# Patient Record
Sex: FEMALE | Race: WHITE | NOT HISPANIC OR LATINO | Employment: OTHER | URBAN - METROPOLITAN AREA
[De-identification: names, ages, dates, MRNs, and addresses within clinical notes are randomized per-mention and may not be internally consistent; named-entity substitution may affect disease eponyms.]

---

## 2021-01-22 ENCOUNTER — HISTORICAL (OUTPATIENT)
Dept: ADMINISTRATIVE | Facility: HOSPITAL | Age: 71
End: 2021-01-22

## 2021-01-22 LAB
ABS NEUT (OLG): 5.01 X10(3)/MCL (ref 2.1–9.2)
ALBUMIN SERPL-MCNC: 4 GM/DL (ref 3.4–4.8)
ALBUMIN/GLOB SERPL: 1.1 RATIO (ref 1.1–2)
ALP SERPL-CCNC: 78 UNIT/L (ref 40–150)
ALT SERPL-CCNC: 13 UNIT/L (ref 0–55)
AST SERPL-CCNC: 14 UNIT/L (ref 5–34)
BASOPHILS # BLD AUTO: 0.1 X10(3)/MCL (ref 0–0.2)
BASOPHILS NFR BLD AUTO: 1 %
BILIRUB SERPL-MCNC: 0.4 MG/DL
BILIRUBIN DIRECT+TOT PNL SERPL-MCNC: 0.2 MG/DL (ref 0–0.5)
BILIRUBIN DIRECT+TOT PNL SERPL-MCNC: 0.2 MG/DL (ref 0–0.8)
BUN SERPL-MCNC: 12 MG/DL (ref 9.8–20.1)
CALCIUM SERPL-MCNC: 9.7 MG/DL (ref 8.4–10.2)
CHLORIDE SERPL-SCNC: 101 MMOL/L (ref 98–107)
CHOLEST SERPL-MCNC: 182 MG/DL
CHOLEST/HDLC SERPL: 3 {RATIO} (ref 0–5)
CO2 SERPL-SCNC: 28 MMOL/L (ref 23–31)
CREAT SERPL-MCNC: 0.69 MG/DL (ref 0.55–1.02)
CREAT UR-MCNC: 21.1 MG/DL (ref 45–106)
EOSINOPHIL # BLD AUTO: 0.1 X10(3)/MCL (ref 0–0.9)
EOSINOPHIL NFR BLD AUTO: 1 %
ERYTHROCYTE [DISTWIDTH] IN BLOOD BY AUTOMATED COUNT: 13.2 % (ref 11.5–14.5)
EST. AVERAGE GLUCOSE BLD GHB EST-MCNC: 139.8 MG/DL
GLOBULIN SER-MCNC: 3.5 GM/DL (ref 2.4–3.5)
GLUCOSE SERPL-MCNC: 133 MG/DL (ref 82–115)
HBA1C MFR BLD: 6.5 %
HCT VFR BLD AUTO: 45.7 % (ref 35–46)
HDLC SERPL-MCNC: 69 MG/DL (ref 35–60)
HGB BLD-MCNC: 14.2 GM/DL (ref 12–16)
IMM GRANULOCYTES # BLD AUTO: 0.02 10*3/UL
IMM GRANULOCYTES NFR BLD AUTO: 0 %
LDLC SERPL CALC-MCNC: 100 MG/DL (ref 50–140)
LYMPHOCYTES # BLD AUTO: 2.3 X10(3)/MCL (ref 0.6–4.6)
LYMPHOCYTES NFR BLD AUTO: 28 %
MCH RBC QN AUTO: 26.5 PG (ref 26–34)
MCHC RBC AUTO-ENTMCNC: 31.1 GM/DL (ref 31–37)
MCV RBC AUTO: 85.3 FL (ref 80–100)
MICROALBUMIN UR-MCNC: <5 UG/ML
MICROALBUMIN/CREAT RATIO PNL UR: <23.7 MG/GM CR (ref 0–30)
MONOCYTES # BLD AUTO: 0.6 X10(3)/MCL (ref 0.1–1.3)
MONOCYTES NFR BLD AUTO: 8 %
NEUTROPHILS # BLD AUTO: 5.01 X10(3)/MCL (ref 2.1–9.2)
NEUTROPHILS NFR BLD AUTO: 62 %
PLATELET # BLD AUTO: 314 X10(3)/MCL (ref 130–400)
PMV BLD AUTO: 9.9 FL (ref 7.4–10.4)
POTASSIUM SERPL-SCNC: 4 MMOL/L (ref 3.5–5.1)
PROT SERPL-MCNC: 7.5 GM/DL (ref 5.8–7.6)
RBC # BLD AUTO: 5.36 X10(6)/MCL (ref 4–5.2)
SODIUM SERPL-SCNC: 142 MMOL/L (ref 136–145)
TRIGL SERPL-MCNC: 67 MG/DL (ref 37–140)
TSH SERPL-ACNC: 1.44 UIU/ML (ref 0.35–4.94)
VLDLC SERPL CALC-MCNC: 13 MG/DL
WBC # SPEC AUTO: 8.1 X10(3)/MCL (ref 4.5–11)

## 2021-04-27 ENCOUNTER — HISTORICAL (OUTPATIENT)
Dept: ADMINISTRATIVE | Facility: HOSPITAL | Age: 71
End: 2021-04-27

## 2021-04-27 LAB
ABS NEUT (OLG): 5.72 X10(3)/MCL (ref 2.1–9.2)
ALBUMIN SERPL-MCNC: 4 GM/DL (ref 3.4–4.8)
ALBUMIN/GLOB SERPL: 1.4 RATIO (ref 1.1–2)
ALP SERPL-CCNC: 78 UNIT/L (ref 40–150)
ALT SERPL-CCNC: 11 UNIT/L (ref 0–55)
AST SERPL-CCNC: 12 UNIT/L (ref 5–34)
BASOPHILS # BLD AUTO: 0.1 X10(3)/MCL (ref 0–0.2)
BASOPHILS NFR BLD AUTO: 0.7 %
BILIRUB SERPL-MCNC: 0.3 MG/DL
BILIRUBIN DIRECT+TOT PNL SERPL-MCNC: 0.1 MG/DL (ref 0–0.5)
BILIRUBIN DIRECT+TOT PNL SERPL-MCNC: 0.2 MG/DL (ref 0–0.8)
BUN SERPL-MCNC: 12.6 MG/DL (ref 9.8–20.1)
CALCIUM SERPL-MCNC: 10 MG/DL (ref 8.4–10.2)
CHLORIDE SERPL-SCNC: 101 MMOL/L (ref 98–107)
CO2 SERPL-SCNC: 33 MMOL/L (ref 23–31)
CREAT SERPL-MCNC: 0.72 MG/DL (ref 0.55–1.02)
EOSINOPHIL # BLD AUTO: 0.1 X10(3)/MCL (ref 0–0.9)
EOSINOPHIL NFR BLD AUTO: 1.1 %
ERYTHROCYTE [DISTWIDTH] IN BLOOD BY AUTOMATED COUNT: 13.3 % (ref 11.5–17)
GLOBULIN SER-MCNC: 2.9 GM/DL (ref 2.4–3.5)
GLUCOSE SERPL-MCNC: 159 MG/DL (ref 82–115)
HCT VFR BLD AUTO: 43.8 % (ref 37–47)
HGB BLD-MCNC: 13.9 GM/DL (ref 12–16)
LYMPHOCYTES # BLD AUTO: 2.3 X10(3)/MCL (ref 0.6–4.6)
LYMPHOCYTES NFR BLD AUTO: 25.9 %
MCH RBC QN AUTO: 26.6 PG (ref 27–31)
MCHC RBC AUTO-ENTMCNC: 31.7 GM/DL (ref 33–36)
MCV RBC AUTO: 83.7 FL (ref 80–94)
MONOCYTES # BLD AUTO: 0.8 X10(3)/MCL (ref 0.1–1.3)
MONOCYTES NFR BLD AUTO: 9.1 %
NEUTROPHILS # BLD AUTO: 5.7 X10(3)/MCL (ref 2.1–9.2)
NEUTROPHILS NFR BLD AUTO: 63.1 %
PLATELET # BLD AUTO: 314 X10(3)/MCL (ref 130–400)
PMV BLD AUTO: 9.2 FL (ref 9.4–12.4)
POTASSIUM SERPL-SCNC: 3.9 MMOL/L (ref 3.5–5.1)
PROT SERPL-MCNC: 6.9 GM/DL (ref 5.8–7.6)
RBC # BLD AUTO: 5.23 X10(6)/MCL (ref 4.2–5.4)
SODIUM SERPL-SCNC: 145 MMOL/L (ref 136–145)
WBC # SPEC AUTO: 9 X10(3)/MCL (ref 4.5–11.5)

## 2021-10-01 ENCOUNTER — HISTORICAL (OUTPATIENT)
Dept: ADMINISTRATIVE | Facility: HOSPITAL | Age: 71
End: 2021-10-01

## 2021-10-01 LAB
ABS NEUT (OLG): 5.73 X10(3)/MCL (ref 2.1–9.2)
ALBUMIN SERPL-MCNC: 3.9 GM/DL (ref 3.4–4.8)
ALBUMIN/GLOB SERPL: 1.1 RATIO (ref 1.1–2)
ALP SERPL-CCNC: 82 UNIT/L (ref 40–150)
ALT SERPL-CCNC: 12 UNIT/L (ref 0–55)
AST SERPL-CCNC: 15 UNIT/L (ref 5–34)
BASOPHILS # BLD AUTO: 0 X10(3)/MCL (ref 0–0.2)
BASOPHILS NFR BLD AUTO: 1 %
BILIRUB SERPL-MCNC: 0.4 MG/DL
BILIRUBIN DIRECT+TOT PNL SERPL-MCNC: 0.2 MG/DL (ref 0–0.5)
BILIRUBIN DIRECT+TOT PNL SERPL-MCNC: 0.2 MG/DL (ref 0–0.8)
BUN SERPL-MCNC: 12.8 MG/DL (ref 9.8–20.1)
CALCIUM SERPL-MCNC: 9.9 MG/DL (ref 8.4–10.2)
CHLORIDE SERPL-SCNC: 102 MMOL/L (ref 98–107)
CHOLEST SERPL-MCNC: 157 MG/DL
CHOLEST/HDLC SERPL: 3 {RATIO} (ref 0–5)
CO2 SERPL-SCNC: 31 MMOL/L (ref 23–31)
CREAT SERPL-MCNC: 0.66 MG/DL (ref 0.55–1.02)
EOSINOPHIL # BLD AUTO: 0.2 X10(3)/MCL (ref 0–0.9)
EOSINOPHIL NFR BLD AUTO: 2 %
ERYTHROCYTE [DISTWIDTH] IN BLOOD BY AUTOMATED COUNT: 13.4 % (ref 11.5–14.5)
EST. AVERAGE GLUCOSE BLD GHB EST-MCNC: 145.6 MG/DL
GLOBULIN SER-MCNC: 3.7 GM/DL (ref 2.4–3.5)
GLUCOSE SERPL-MCNC: 134 MG/DL (ref 82–115)
HBA1C MFR BLD: 6.7 %
HCT VFR BLD AUTO: 46.2 % (ref 35–46)
HDLC SERPL-MCNC: 61 MG/DL (ref 35–60)
HGB BLD-MCNC: 14.3 GM/DL (ref 12–16)
IMM GRANULOCYTES # BLD AUTO: 0.02 10*3/UL
IMM GRANULOCYTES NFR BLD AUTO: 0 %
LDLC SERPL CALC-MCNC: 81 MG/DL (ref 50–140)
LYMPHOCYTES # BLD AUTO: 1.9 X10(3)/MCL (ref 0.6–4.6)
LYMPHOCYTES NFR BLD AUTO: 22 %
MCH RBC QN AUTO: 26.6 PG (ref 26–34)
MCHC RBC AUTO-ENTMCNC: 31 GM/DL (ref 31–37)
MCV RBC AUTO: 85.9 FL (ref 80–100)
MONOCYTES # BLD AUTO: 0.7 X10(3)/MCL (ref 0.1–1.3)
MONOCYTES NFR BLD AUTO: 8 %
NEUTROPHILS # BLD AUTO: 5.73 X10(3)/MCL (ref 2.1–9.2)
NEUTROPHILS NFR BLD AUTO: 67 %
NRBC BLD AUTO-RTO: 0 % (ref 0–0.2)
PLATELET # BLD AUTO: 325 X10(3)/MCL (ref 130–400)
PMV BLD AUTO: 9.8 FL (ref 7.4–10.4)
POTASSIUM SERPL-SCNC: 4 MMOL/L (ref 3.5–5.1)
PROT SERPL-MCNC: 7.6 GM/DL (ref 5.8–7.6)
RBC # BLD AUTO: 5.38 X10(6)/MCL (ref 4–5.2)
SODIUM SERPL-SCNC: 140 MMOL/L (ref 136–145)
TRIGL SERPL-MCNC: 77 MG/DL (ref 37–140)
TSH SERPL-ACNC: 1.16 UIU/ML (ref 0.35–4.94)
VLDLC SERPL CALC-MCNC: 15 MG/DL
WBC # SPEC AUTO: 8.6 X10(3)/MCL (ref 4.5–11)

## 2021-10-18 ENCOUNTER — HISTORICAL (OUTPATIENT)
Dept: FAMILY MEDICINE | Facility: CLINIC | Age: 71
End: 2021-10-18

## 2021-10-18 LAB — SARS-COV-2 RNA RESP QL NAA+PROBE: NOT DETECTED

## 2022-04-11 ENCOUNTER — HISTORICAL (OUTPATIENT)
Dept: ADMINISTRATIVE | Facility: HOSPITAL | Age: 72
End: 2022-04-11
Payer: MEDICARE

## 2022-04-27 VITALS — SYSTOLIC BLOOD PRESSURE: 146 MMHG | DIASTOLIC BLOOD PRESSURE: 74 MMHG

## 2022-04-27 DIAGNOSIS — C50.111 MALIGNANT NEOPLASM OF CENTRAL PORTION OF RIGHT FEMALE BREAST, UNSPECIFIED ESTROGEN RECEPTOR STATUS: Primary | ICD-10-CM

## 2022-04-27 DIAGNOSIS — Z79.811 USE OF ANASTROZOLE: ICD-10-CM

## 2022-04-27 DIAGNOSIS — Z17.0 ESTROGEN RECEPTOR POSITIVE: ICD-10-CM

## 2022-05-23 ENCOUNTER — OFFICE VISIT (OUTPATIENT)
Dept: URGENT CARE | Facility: CLINIC | Age: 72
End: 2022-05-23
Payer: MEDICARE

## 2022-05-23 VITALS
TEMPERATURE: 98 F | HEART RATE: 71 BPM | RESPIRATION RATE: 22 BRPM | OXYGEN SATURATION: 98 % | HEIGHT: 69 IN | SYSTOLIC BLOOD PRESSURE: 125 MMHG | DIASTOLIC BLOOD PRESSURE: 74 MMHG | WEIGHT: 251.19 LBS | BODY MASS INDEX: 37.2 KG/M2

## 2022-05-23 DIAGNOSIS — Z11.52 ENCOUNTER FOR SCREENING FOR COVID-19: ICD-10-CM

## 2022-05-23 DIAGNOSIS — J06.9 ACUTE URI: Primary | ICD-10-CM

## 2022-05-23 DIAGNOSIS — R05.9 COUGH: ICD-10-CM

## 2022-05-23 LAB
FLUAV AG UPPER RESP QL IA.RAPID: NOT DETECTED
FLUBV AG UPPER RESP QL IA.RAPID: NOT DETECTED
RSV A 5' UTR RNA NPH QL NAA+PROBE: NOT DETECTED
SARS-COV-2 RNA RESP QL NAA+PROBE: NOT DETECTED

## 2022-05-23 PROCEDURE — 87636 SARSCOV2 & INF A&B AMP PRB: CPT | Performed by: NURSE PRACTITIONER

## 2022-05-23 PROCEDURE — 99215 OFFICE O/P EST HI 40 MIN: CPT | Mod: PBBFAC | Performed by: NURSE PRACTITIONER

## 2022-05-23 PROCEDURE — 99213 PR OFFICE/OUTPT VISIT, EST, LEVL III, 20-29 MIN: ICD-10-PCS | Mod: S$PBB,,, | Performed by: NURSE PRACTITIONER

## 2022-05-23 PROCEDURE — 99213 OFFICE O/P EST LOW 20 MIN: CPT | Mod: S$PBB,,, | Performed by: NURSE PRACTITIONER

## 2022-05-23 RX ORDER — BENZONATATE 200 MG/1
200 CAPSULE ORAL 3 TIMES DAILY PRN
Qty: 20 CAPSULE | Refills: 0 | Status: SHIPPED | OUTPATIENT
Start: 2022-05-23 | End: 2022-06-02

## 2022-05-23 RX ORDER — ANASTROZOLE 1 MG/1
TABLET ORAL
COMMUNITY
Start: 2022-04-22 | End: 2022-07-25

## 2022-05-23 RX ORDER — METOPROLOL SUCCINATE 50 MG/1
50 TABLET, EXTENDED RELEASE ORAL
COMMUNITY
Start: 2021-11-03

## 2022-05-23 RX ORDER — PRAVASTATIN SODIUM 20 MG/1
40 TABLET ORAL
COMMUNITY

## 2022-05-23 RX ORDER — LORATADINE 10 MG/1
10 TABLET ORAL DAILY PRN
Refills: 0 | COMMUNITY
Start: 2022-05-23 | End: 2023-03-27

## 2022-05-23 RX ORDER — MONTELUKAST SODIUM 10 MG/1
10 TABLET ORAL
COMMUNITY

## 2022-05-23 RX ORDER — METFORMIN HYDROCHLORIDE 500 MG/1
500 TABLET ORAL
COMMUNITY

## 2022-05-23 RX ORDER — LISINOPRIL AND HYDROCHLOROTHIAZIDE 12.5; 2 MG/1; MG/1
TABLET ORAL
COMMUNITY
Start: 2022-03-25

## 2022-05-23 RX ORDER — ROSUVASTATIN CALCIUM 40 MG/1
40 TABLET, COATED ORAL DAILY
COMMUNITY
Start: 2022-05-11

## 2022-05-23 RX ORDER — PANTOPRAZOLE SODIUM 40 MG/1
40 TABLET, DELAYED RELEASE ORAL
COMMUNITY

## 2022-05-23 RX ORDER — BECLOMETHASONE DIPROPIONATE HFA 40 UG/1
AEROSOL, METERED RESPIRATORY (INHALATION)
COMMUNITY
Start: 2022-02-07

## 2022-05-23 RX ORDER — MELOXICAM 15 MG/1
7.5 TABLET ORAL
COMMUNITY

## 2022-05-23 RX ORDER — AMLODIPINE BESYLATE 10 MG/1
10 TABLET ORAL
COMMUNITY
Start: 2021-11-03

## 2022-05-23 NOTE — PROGRESS NOTES
"Subjective:       Patient ID: Jose L Mcdonough is a 71 y.o. female.    Vitals:  height is 5' 9" (1.753 m) and weight is 113.9 kg (251 lb 3.2 oz). Her temperature is 98 °F (36.7 °C). Her blood pressure is 125/74 and her pulse is 71. Her respiration is 22 (abnormal) and oxygen saturation is 98%.     Chief Complaint: Nasal Congestion (Post nasal drip x 3 days) and Cough (X 3 days )    Patient is a 71-year-old female, here today for nasal congestion, cough over the past 3 days.  Denies ear pain, sore throat, body aches, fever, shortness of breath, wheezing.  Denies exposure to anyone diagnosed with COVID or flu.  Patient states family member was sick over the past week with ear infection, states his COVID and flu swabs were negative.      Constitution: Negative.   HENT: Positive for congestion and postnasal drip.    Neck: neck negative.   Cardiovascular: Negative.    Eyes: Negative.    Respiratory: Positive for cough.    Musculoskeletal: Negative.        Objective:      Physical Exam   Constitutional: She is oriented to person, place, and time. She appears well-developed.   HENT:   Head: Normocephalic.   Ears:   Right Ear: Tympanic membrane normal.   Left Ear: Tympanic membrane normal.   Nose: Congestion present.   Eyes: Conjunctivae and EOM are normal. Pupils are equal, round, and reactive to light.   Neck: Neck supple.   Cardiovascular: Normal rate, regular rhythm and normal heart sounds.   Pulmonary/Chest: Effort normal and breath sounds normal.   Musculoskeletal: Normal range of motion.         General: Normal range of motion.   Neurological: She is alert and oriented to person, place, and time.   Skin: Skin is warm and dry.   Psychiatric: Her behavior is normal.   Vitals reviewed.        Assessment:       1. Acute URI    2. Encounter for screening for COVID-19    3. Cough             No results found.   Plan:       Medication as ordered. May use humidifier.  If any wheezing, shortness of breath, fever, no improvement " in symptoms within 3-4 days or any new symptoms then immediately return to clinic or go to ER.  Flu/covid swab pending, will notify of abnormal results.     Acute URI    Encounter for screening for COVID-19  -     COVID/RSV/FLU A&B PCR    Cough

## 2022-05-24 ENCOUNTER — TELEPHONE (OUTPATIENT)
Dept: URGENT CARE | Facility: CLINIC | Age: 72
End: 2022-05-24
Payer: MEDICARE

## 2022-06-08 ENCOUNTER — LAB VISIT (OUTPATIENT)
Dept: LAB | Facility: HOSPITAL | Age: 72
End: 2022-06-08
Attending: INTERNAL MEDICINE
Payer: MEDICARE

## 2022-06-08 ENCOUNTER — TELEPHONE (OUTPATIENT)
Dept: HEMATOLOGY/ONCOLOGY | Facility: CLINIC | Age: 72
End: 2022-06-08
Payer: MEDICARE

## 2022-06-08 DIAGNOSIS — E78.00 TYPE 2 DIABETES MELLITUS WITH HYPERCHOLESTEROLEMIA: Primary | ICD-10-CM

## 2022-06-08 DIAGNOSIS — Z17.0 ESTROGEN RECEPTOR POSITIVE: ICD-10-CM

## 2022-06-08 DIAGNOSIS — I11.9 HYPERTENSIVE HEART DISEASE WITHOUT HEART FAILURE: ICD-10-CM

## 2022-06-08 DIAGNOSIS — E11.69 TYPE 2 DIABETES MELLITUS WITH HYPERCHOLESTEROLEMIA: Primary | ICD-10-CM

## 2022-06-08 DIAGNOSIS — Z79.811 USE OF ANASTROZOLE: ICD-10-CM

## 2022-06-08 DIAGNOSIS — C50.111 MALIGNANT NEOPLASM OF CENTRAL PORTION OF RIGHT FEMALE BREAST: ICD-10-CM

## 2022-06-08 LAB
ALBUMIN SERPL-MCNC: 3.7 GM/DL (ref 3.4–4.8)
ALBUMIN/GLOB SERPL: 1.1 RATIO (ref 1.1–2)
ALP SERPL-CCNC: 66 UNIT/L (ref 40–150)
ALT SERPL-CCNC: 11 UNIT/L (ref 0–55)
AST SERPL-CCNC: 13 UNIT/L (ref 5–34)
BASOPHILS # BLD AUTO: 0.05 X10(3)/MCL (ref 0–0.2)
BASOPHILS NFR BLD AUTO: 0.6 %
BILIRUBIN DIRECT+TOT PNL SERPL-MCNC: 0.4 MG/DL
BUN SERPL-MCNC: 11.6 MG/DL (ref 9.8–20.1)
CALCIUM SERPL-MCNC: 10 MG/DL (ref 8.4–10.2)
CHLORIDE SERPL-SCNC: 101 MMOL/L (ref 98–107)
CHOLEST SERPL-MCNC: 148 MG/DL
CHOLEST/HDLC SERPL: 2 {RATIO} (ref 0–5)
CO2 SERPL-SCNC: 33 MMOL/L (ref 23–31)
CREAT SERPL-MCNC: 0.73 MG/DL (ref 0.55–1.02)
CREAT UR-MCNC: 47.3 MG/DL (ref 47–110)
EOSINOPHIL # BLD AUTO: 0.15 X10(3)/MCL (ref 0–0.9)
EOSINOPHIL NFR BLD AUTO: 1.8 %
ERYTHROCYTE [DISTWIDTH] IN BLOOD BY AUTOMATED COUNT: 13.8 % (ref 11.5–17)
EST. AVERAGE GLUCOSE BLD GHB EST-MCNC: 148.5 MG/DL
GLOBULIN SER-MCNC: 3.4 GM/DL (ref 2.4–3.5)
GLUCOSE SERPL-MCNC: 185 MG/DL (ref 82–115)
HBA1C MFR BLD: 6.8 %
HCT VFR BLD AUTO: 43.1 % (ref 37–47)
HDLC SERPL-MCNC: 65 MG/DL (ref 35–60)
HGB BLD-MCNC: 13.6 GM/DL (ref 12–16)
IMM GRANULOCYTES # BLD AUTO: 0.02 X10(3)/MCL (ref 0–0.02)
IMM GRANULOCYTES NFR BLD AUTO: 0.2 % (ref 0–0.43)
LDLC SERPL CALC-MCNC: 68 MG/DL (ref 50–140)
LYMPHOCYTES # BLD AUTO: 2.12 X10(3)/MCL (ref 0.6–4.6)
LYMPHOCYTES NFR BLD AUTO: 25 %
MCH RBC QN AUTO: 26 PG (ref 27–31)
MCHC RBC AUTO-ENTMCNC: 31.6 MG/DL (ref 33–36)
MCV RBC AUTO: 82.3 FL (ref 80–94)
MICROALBUMIN UR-MCNC: 37.1 UG/ML
MICROALBUMIN/CREAT RATIO PNL UR: 78.4 MG/GM CR (ref 0–30)
MONOCYTES # BLD AUTO: 0.61 X10(3)/MCL (ref 0.1–1.3)
MONOCYTES NFR BLD AUTO: 7.2 %
NEUTROPHILS # BLD AUTO: 5.5 X10(3)/MCL (ref 2.1–9.2)
NEUTROPHILS NFR BLD AUTO: 65.2 %
NRBC BLD AUTO-RTO: 0 %
PLATELET # BLD AUTO: 296 X10(3)/MCL (ref 130–400)
PMV BLD AUTO: 9 FL (ref 9.4–12.4)
POTASSIUM SERPL-SCNC: 4 MMOL/L (ref 3.5–5.1)
PROT SERPL-MCNC: 7.1 GM/DL (ref 5.8–7.6)
RBC # BLD AUTO: 5.24 X10(6)/MCL (ref 4.2–5.4)
SODIUM SERPL-SCNC: 143 MMOL/L (ref 136–145)
TRIGL SERPL-MCNC: 77 MG/DL (ref 37–140)
TSH SERPL-ACNC: 1.35 UIU/ML (ref 0.35–4.94)
VLDLC SERPL CALC-MCNC: 15 MG/DL
WBC # SPEC AUTO: 8.5 X10(3)/MCL (ref 4.5–11.5)

## 2022-06-08 PROCEDURE — 84443 ASSAY THYROID STIM HORMONE: CPT

## 2022-06-08 PROCEDURE — 86300 IMMUNOASSAY TUMOR CA 15-3: CPT

## 2022-06-08 PROCEDURE — 82043 UR ALBUMIN QUANTITATIVE: CPT

## 2022-06-08 PROCEDURE — 80053 COMPREHEN METABOLIC PANEL: CPT

## 2022-06-08 PROCEDURE — 85025 COMPLETE CBC W/AUTO DIFF WBC: CPT

## 2022-06-08 PROCEDURE — 83036 HEMOGLOBIN GLYCOSYLATED A1C: CPT

## 2022-06-08 PROCEDURE — 80061 LIPID PANEL: CPT

## 2022-06-08 PROCEDURE — 36415 COLL VENOUS BLD VENIPUNCTURE: CPT

## 2022-06-10 LAB — CANCER AG27-29 SERPL-ACNC: 13.9 U/ML

## 2022-07-27 ENCOUNTER — OFFICE VISIT (OUTPATIENT)
Dept: HEMATOLOGY/ONCOLOGY | Facility: CLINIC | Age: 72
End: 2022-07-27
Payer: MEDICARE

## 2022-07-27 VITALS
DIASTOLIC BLOOD PRESSURE: 64 MMHG | BODY MASS INDEX: 37.77 KG/M2 | HEART RATE: 66 BPM | SYSTOLIC BLOOD PRESSURE: 142 MMHG | TEMPERATURE: 98 F | WEIGHT: 255 LBS | HEIGHT: 69 IN | OXYGEN SATURATION: 97 %

## 2022-07-27 DIAGNOSIS — Z17.0 MALIGNANT NEOPLASM OF CENTRAL PORTION OF RIGHT BREAST IN FEMALE, ESTROGEN RECEPTOR POSITIVE: Primary | ICD-10-CM

## 2022-07-27 DIAGNOSIS — Z79.811 AROMATASE INHIBITOR USE: ICD-10-CM

## 2022-07-27 DIAGNOSIS — Z79.811 USE OF AROMATASE INHIBITORS: ICD-10-CM

## 2022-07-27 DIAGNOSIS — C50.111 MALIGNANT NEOPLASM OF CENTRAL PORTION OF RIGHT BREAST IN FEMALE, ESTROGEN RECEPTOR POSITIVE: Primary | ICD-10-CM

## 2022-07-27 PROCEDURE — 99214 OFFICE O/P EST MOD 30 MIN: CPT | Mod: S$PBB,,, | Performed by: NURSE PRACTITIONER

## 2022-07-27 PROCEDURE — 99999 PR PBB SHADOW E&M-EST. PATIENT-LVL IV: CPT | Mod: PBBFAC,,, | Performed by: NURSE PRACTITIONER

## 2022-07-27 PROCEDURE — 99999 PR PBB SHADOW E&M-EST. PATIENT-LVL IV: ICD-10-PCS | Mod: PBBFAC,,, | Performed by: NURSE PRACTITIONER

## 2022-07-27 PROCEDURE — 99214 OFFICE O/P EST MOD 30 MIN: CPT | Mod: PBBFAC | Performed by: NURSE PRACTITIONER

## 2022-07-27 PROCEDURE — 99214 PR OFFICE/OUTPT VISIT, EST, LEVL IV, 30-39 MIN: ICD-10-PCS | Mod: S$PBB,,, | Performed by: NURSE PRACTITIONER

## 2022-07-27 RX ORDER — ANASTROZOLE 1 MG/1
1 TABLET ORAL DAILY
Qty: 30 TABLET | Refills: 6 | Status: SHIPPED | OUTPATIENT
Start: 2022-07-27 | End: 2022-07-28

## 2022-07-27 NOTE — PROGRESS NOTES
Heme/Onc Progress Note    PATIENT: Jose L Mcdonough  MRN: [id number]  DATE: 7/27/2022  Chief Complaint: No Concerns today        Oncology History   Initial Consultation:9/20/18  Referring Physician: Dr Chao Christensen  Other Physicians:  Code Status: Not addressed    Diagnosis/Problem list:   T1 N0 MX -Stage I Right breast carcinoma, ER/IL positive, HER-2 negative, 0/3 SLN + Dx 7/30/18  Oncotype RR 15    Present Treatment:  Arimidex---started 11/2018    Treatment history:    Right lumpectomy 8/15/18    Plan of care: Postlumpectomy radiation followed by > 5 years of aromatase inhibitors    Clinical History: Ms Mcdonough kindly referred by Dr. Christensen for newly diagnosed breast cancer.  Patient was undergoing routine mammograms at [facility] on 7/18/18 and new asymmetry in the retroareolar region of the right breast was seen require additional imaging.  Mammogram was read category 0 Spot compression and magnification views of the right breast and possible ultrasound was recommended.  Mammogram was read by Dr. Lisa Barney.    On 7/26/18 she underwent diagnostic mammogram and ultrasound of the right breast that revealed a suspicious spiculated 3 mm mass located in the retroareolar right breast at 12 o'clock position.  Ultrasound-guided biopsy was recommended.  Ultrasound-guided core biopsy of the right breast on 7/30/18 was performed with pathology revealing 0.9 cm invasive carcinoma with ductal and lobular features.  No lymphovascular invasion identified.  Ductal carcinoma in situ, solid cribriform and papillary patterns (intermediate nuclear grade) with calcification and without necrosis.    On 8/15/18 she underwent right breast lumpectomy with right axillary sentinel lymph node biopsy by Dr. Chao Christensen.  Pathology report again revealed well-differentiated invasive ductal carcinoma, 1.2 cm in greatest dimension.  Clear margins.  Ductal carcinoma in situ, intermediate nuclear grade, 0.5 cm, solid and  cribriform types with abundant calcifications and without necrosis.  Clear margins.  3 sentinel lymph node all negative for malignancy.  Estrogen receptor 94.8%, progesterone receptor 94.7%, HER-2/serafin 2+ by IHC, negative by fish.  Ki 6720.5%.  Genetic testing was performed with negative results.    Oncotype revealed a recurrence score results of 15, meaning 10% risk of distant recurrence at 10 years, low recurrence risk.      Mother was diagnosed with metastatic breast cancer in her 70's. Her sister has breast cancer and diagnosed at 66.  Maternal grandmother colon cancer when she was elderly.    Patient denies any problems.  She denies any fever, chills, sweats.  No chest pain or shortness of breath.  No changes in bowel habits.  No bleeding.  No neurological symptoms.  Patient has healed well from her surgery.    Bone density 11/1/18 performed at [facility] and was normal.       On 2/26/2019 she had a right breast mammogram and ultrasound that demonstrated no mammographic or sonographic evidence for breast malignancy. There are post surgical changes in the right breast and right axilla. There is a 26 mm seroma in the lumpectomy bed in the retroareolar right breast. Patient should return in July for a diagnostic bilateral mammograms to resume her annual mammographic screening interval.    On April 4, 2019, she underwent her first colonoscopy. She had 6 polyps removed. 3 were tubular adenomas. One was a sessile serrated adenoma.  2 were hyperplastic polyps. She will have the colonoscopy repeated in one year.    Past Medical History:   Diagnosis Date    DM (diabetes mellitus)     Hyperlipidemia     Hypertension     Malignant neoplasm of right female breast     Unspecified chronic bronchitis            Review of Systems   Constitutional: Negative for appetite change and unexpected weight change.   HENT: Negative for mouth sores.    Eyes: Negative for visual disturbance.   Respiratory: Negative for cough  and shortness of breath.    Cardiovascular: Negative for chest pain.   Gastrointestinal: Positive for diarrhea. Negative for abdominal pain.   Genitourinary: Positive for frequency.   Musculoskeletal: Negative for back pain.   Integumentary:  Negative for rash.   Neurological: Negative for headaches.   Hematological: Negative for adenopathy.   Psychiatric/Behavioral: The patient is not nervous/anxious.      Interval History, 7/27/22:    Patient presents today in f/u of breast cancer diagnosed in 7/2018, currently on Arimidex. She completed radiation on 10/30/18 and started Arimidex in 11/2018. She is tolerating well.  No fever, chills, sweats.  No chest pain or shortness of breath.  No changes in bowel habits.   She reports that Dr Christensen did not like [place] and had an aspiration and was just fat tissue and no papilloma in 10/2021. Repeat US in 4/2022 with no changes. Next mammogram is due in 9/2022, ordered by Dr. Christensen.       Objective:     Vitals:    07/27/22 0855   BP: (!) 142/64   Pulse: 66   Temp: 97.8 °F (36.6 °C)         Physical Exam    Assessment:     Stage I right breast cancer-- Dx 2018  ER/TX pos  Oncotype RR 15    Problem List Items Addressed This Visit        Oncology    Malignant neoplasm of central portion of right breast in female, estrogen receptor positive - Primary    Relevant Orders    CBC Auto Differential    Comprehensive Metabolic Panel    Cancer Antigen 27-29    Use of aromatase inhibitors    Relevant Orders    CBC Auto Differential    Comprehensive Metabolic Panel    Cancer Antigen 27-29      Other Visit Diagnoses     Aromatase inhibitor use        Relevant Medications    anastrozole (ARIMIDEX) 1 mg Tab            Plan:        Continue Arimidex 1 mg po daily x > 5yrs  Continue calcium + vitamin D & weight bearing exercises.   Bone density is due in 10/2023  Mammogram ordered by Dr. Christensen--she usually sees Dr Christensen after   RTC in 6 months with labs

## 2022-08-10 ENCOUNTER — CLINICAL SUPPORT (OUTPATIENT)
Dept: RESPIRATORY THERAPY | Facility: HOSPITAL | Age: 72
End: 2022-08-10
Attending: SURGERY
Payer: MEDICARE

## 2022-08-10 ENCOUNTER — HOSPITAL ENCOUNTER (OUTPATIENT)
Dept: RADIOLOGY | Facility: HOSPITAL | Age: 72
Discharge: HOME | End: 2022-08-10
Attending: SURGERY
Payer: MEDICARE

## 2022-08-10 DIAGNOSIS — Z01.818 PRE-OP TESTING: ICD-10-CM

## 2022-08-10 DIAGNOSIS — Z01.818 PRE-OP TESTING: Primary | ICD-10-CM

## 2022-08-10 PROCEDURE — 93010 EKG 12-LEAD: ICD-10-PCS | Mod: ,,, | Performed by: INTERNAL MEDICINE

## 2022-08-10 PROCEDURE — 93005 ELECTROCARDIOGRAM TRACING: CPT

## 2022-08-10 PROCEDURE — 93010 ELECTROCARDIOGRAM REPORT: CPT | Mod: ,,, | Performed by: INTERNAL MEDICINE

## 2022-08-10 PROCEDURE — 71046 X-RAY EXAM CHEST 2 VIEWS: CPT | Mod: TC

## 2023-03-27 ENCOUNTER — OFFICE VISIT (OUTPATIENT)
Dept: HEMATOLOGY/ONCOLOGY | Facility: CLINIC | Age: 73
End: 2023-03-27
Payer: MEDICARE

## 2023-03-27 VITALS
TEMPERATURE: 98 F | HEART RATE: 66 BPM | BODY MASS INDEX: 37.67 KG/M2 | RESPIRATION RATE: 20 BRPM | OXYGEN SATURATION: 96 % | WEIGHT: 254.31 LBS | HEIGHT: 69 IN | DIASTOLIC BLOOD PRESSURE: 69 MMHG | SYSTOLIC BLOOD PRESSURE: 127 MMHG

## 2023-03-27 DIAGNOSIS — Z13.820 OSTEOPOROSIS SCREENING: ICD-10-CM

## 2023-03-27 DIAGNOSIS — Z79.811 USE OF AROMATASE INHIBITORS: ICD-10-CM

## 2023-03-27 DIAGNOSIS — Z17.0 MALIGNANT NEOPLASM OF CENTRAL PORTION OF RIGHT BREAST IN FEMALE, ESTROGEN RECEPTOR POSITIVE: ICD-10-CM

## 2023-03-27 DIAGNOSIS — Z78.0 POSTMENOPAUSAL STATUS: Primary | ICD-10-CM

## 2023-03-27 DIAGNOSIS — C50.111 MALIGNANT NEOPLASM OF CENTRAL PORTION OF RIGHT BREAST IN FEMALE, ESTROGEN RECEPTOR POSITIVE: ICD-10-CM

## 2023-03-27 PROCEDURE — 99215 OFFICE O/P EST HI 40 MIN: CPT | Mod: PBBFAC | Performed by: INTERNAL MEDICINE

## 2023-03-27 PROCEDURE — 99214 PR OFFICE/OUTPT VISIT, EST, LEVL IV, 30-39 MIN: ICD-10-PCS | Mod: S$PBB,,, | Performed by: INTERNAL MEDICINE

## 2023-03-27 PROCEDURE — 99999 PR PBB SHADOW E&M-EST. PATIENT-LVL V: CPT | Mod: PBBFAC,,, | Performed by: INTERNAL MEDICINE

## 2023-03-27 PROCEDURE — 99214 OFFICE O/P EST MOD 30 MIN: CPT | Mod: S$PBB,,, | Performed by: INTERNAL MEDICINE

## 2023-03-27 PROCEDURE — 99999 PR PBB SHADOW E&M-EST. PATIENT-LVL V: ICD-10-PCS | Mod: PBBFAC,,, | Performed by: INTERNAL MEDICINE

## 2023-03-27 RX ORDER — ALBUTEROL SULFATE 0.63 MG/3ML
0.63 SOLUTION RESPIRATORY (INHALATION) 2 TIMES DAILY
COMMUNITY

## 2023-03-27 NOTE — PROGRESS NOTES
HEMATOLOGY/ONCOLOGY OFFICE CLINIC VISIT    Visit Information:    Initial Consultation:9/20/18  Referring Physician: Dr Chao Christensen  Other Physicians:  Code Status: Not addressed     Diagnosis/Problem list:   T1 N0 MX -Stage I Right breast carcinoma, ER/MA positive, HER-2 negative, 0/3 SLN + Dx 7/30/18  Oncotype RR 15     Present Treatment:  Arimidex---started 11/2018     Treatment history:    Right lumpectomy 8/15/18     Plan of care: Endocrine therapy > 5 years    Imaging:  MMG 9/16/2021: BI-RADS 2-benign findings   MMG 9/19/2022: BI-RADS 2-benign findings       Bone density 9/16/2021 Interval minimal bone mineral density decrease however, the study remains within normal limits. Repeat exam is recommended in September 2023.        Pathology:      CLINICAL HISTORY:       Patient: Jose L Mcdonough is a 72 y.o. female.kindly referred by Dr. Christensen for newly diagnosed breast cancer.  Patient was undergoing routine mammograms at [facility] on 7/18/18 and new asymmetry in the retroareolar region of the right breast was seen require additional imaging.  Mammogram was read category 0 Spot compression and magnification views of the right breast and possible ultrasound was recommended.  Mammogram was read by Dr. Lisa Barney.     On 7/26/18 she underwent diagnostic mammogram and ultrasound of the right breast that revealed a suspicious spiculated 3 mm mass located in the retroareolar right breast at 12 o'clock position.  Ultrasound-guided biopsy was recommended.  Ultrasound-guided core biopsy of the right breast on 7/30/18 was performed with pathology revealing 0.9 cm invasive carcinoma with ductal and lobular features.  No lymphovascular invasion identified.  Ductal carcinoma in situ, solid cribriform and papillary patterns (intermediate nuclear grade) with calcification and without necrosis.     On 8/15/18 she underwent right breast lumpectomy with right axillary sentinel lymph node biopsy by Dr. Guidry  Fermin.  Pathology report again revealed well-differentiated invasive ductal carcinoma, 1.2 cm in greatest dimension.  Clear margins.  Ductal carcinoma in situ, intermediate nuclear grade, 0.5 cm, solid and cribriform types with abundant calcifications and without necrosis.  Clear margins.  3 sentinel lymph node all negative for malignancy.  Estrogen receptor 94.8%, progesterone receptor 94.7%, HER-2/serafin 2+ by IHC, negative by fish.  Ki 6720.5%.  Genetic testing was performed with negative results.     Oncotype revealed a recurrence score results of 15, meaning 10% risk of distant recurrence at 10 years, low recurrence risk.      Mother was diagnosed with metastatic breast cancer in her 70's. Her sister has breast cancer and diagnosed at 66.  Maternal grandmother colon cancer when she was elderly.     Patient denies any problems.  She denies any fever, chills, sweats.  No chest pain or shortness of breath.  No changes in bowel habits.  No bleeding.  No neurological symptoms.  Patient has healed well from her surgery.     Bone density 11/1/18 performed at [facility] and was normal.        On 2/26/2019 she had a right breast mammogram and ultrasound that demonstrated no mammographic or sonographic evidence for breast malignancy. There are post surgical changes in the right breast and right axilla. There is a 26 mm seroma in the lumpectomy bed in the retroareolar right breast. Patient should return in July for a diagnostic bilateral mammograms to resume her annual mammographic screening interval.     On April 4, 2019, she underwent her first colonoscopy. She had 6 polyps removed. 3 were tubular adenomas. One was a sessile serrated adenoma.  2 were hyperplastic polyps.   She reports that Dr Christensen did not like [place] and had an aspiration and was just fat tissue and no papilloma in 10/2021.     Chief Complaint: no complaints today      Interval History:    Patient presents today in f/u  of breast cancer diagnosed in 7/2018, currently on Arimidex. She completed radiation on 10/30/18 and started Arimidex in 11/2018. She is tolerating well.  No fever, chills, sweats.  No chest pain or shortness of breath.  No changes in bowel habits.   Repeat US in 4/2022 with no changes. Next mammogram is due in 9/2022, ordered by Dr. Christensen.  She is due for      Past Medical History:   Diagnosis Date    DM (diabetes mellitus)     Hyperlipidemia     Hypertension     Malignant neoplasm of right female breast     Unspecified chronic bronchitis       Past Surgical History:   Procedure Laterality Date    left breast lumpectomy      MASTECTOMY Right      Family History   Family history unknown: Yes     Social Connections: Not on file       Review of patient's allergies indicates:   Allergen Reactions    Adhesive Hives     Blister, itch, pain    Sulfa (sulfonamide antibiotics)     Cephalosporins Itching     Other reaction(s): itch    Penicillins Itching     Other reaction(s): itch      Current Outpatient Medications on File Prior to Visit   Medication Sig Dispense Refill    albuterol (ACCUNEB) 0.63 mg/3 mL Nebu Take 0.63 mg by nebulization 2 (two) times a day.      amLODIPine (NORVASC) 10 MG tablet Take 10 mg by mouth.      anastrozole (ARIMIDEX) 1 mg Tab TAKE ONE TABLET BY MOUTH EVERY DAY 30 tablet 6    lisinopriL-hydrochlorothiazide (PRINZIDE,ZESTORETIC) 20-12.5 mg per tablet       meloxicam (MOBIC) 15 MG tablet Take 7.5 mg by mouth.      metFORMIN (GLUCOPHAGE) 500 MG tablet Take 500 mg by mouth.      metoprolol succinate (TOPROL-XL) 50 MG 24 hr tablet Take 50 mg by mouth.      montelukast (SINGULAIR) 10 mg tablet Take 10 mg by mouth.      pantoprazole (PROTONIX) 40 MG tablet Take 40 mg by mouth.      pravastatin (PRAVACHOL) 20 MG tablet Take 40 mg by mouth.      QVAR REDIHALER 40 mcg/actuation HFAB       rosuvastatin (CRESTOR) 40 MG Tab Take 40 mg by mouth once daily.      [DISCONTINUED] loratadine (CLARITIN) 10 mg  "tablet Take 1 tablet (10 mg total) by mouth daily as needed for Allergies.  0     No current facility-administered medications on file prior to visit.      Review of Systems   Constitutional:  Negative for activity change, appetite change, chills, fatigue, fever and unexpected weight change.   HENT:  Negative for mouth dryness, mouth sores, nosebleeds, sore throat and trouble swallowing.    Eyes:  Negative for visual disturbance.   Respiratory:  Negative for cough and shortness of breath.    Cardiovascular:  Negative for chest pain, palpitations and leg swelling.   Gastrointestinal:  Negative for abdominal distention, abdominal pain, blood in stool, change in bowel habit, constipation, diarrhea, nausea, vomiting and change in bowel habit.   Endocrine: Negative.    Genitourinary:  Positive for frequency. Negative for dysuria, hematuria and urgency.   Musculoskeletal:  Negative for arthralgias, back pain, myalgias and neck pain.   Integumentary:  Negative for rash, breast mass, breast discharge and breast tenderness.   Neurological:  Negative for dizziness, tremors, syncope, speech difficulty, weakness, light-headedness, numbness, headaches and memory loss.   Hematological:  Negative for adenopathy. Does not bruise/bleed easily.   Psychiatric/Behavioral:  Negative for confusion and suicidal ideas. The patient is not nervous/anxious.    Breast: Negative for mass and tenderness           Vitals:    03/27/23 1048   BP: 127/69   BP Location: Left arm   Patient Position: Sitting   BP Method: Large (Automatic)   Pulse: 66   Resp: 20   Temp: 98.3 °F (36.8 °C)   TempSrc: Oral   SpO2: 96%   Weight: 115.3 kg (254 lb 4.8 oz)   Height: 5' 9" (1.753 m)      Body mass index is 37.55 kg/m².  Body surface area is 2.37 meters squared.  Physical Exam  Vitals and nursing note reviewed.   Constitutional:       General: She is not in acute distress.     Appearance: Normal appearance. She is well-developed.   HENT:      Head: Normocephalic " and atraumatic.      Mouth/Throat:      Mouth: Mucous membranes are moist.   Eyes:      General: No scleral icterus.     Extraocular Movements: Extraocular movements intact.      Conjunctiva/sclera: Conjunctivae normal.      Pupils: Pupils are equal, round, and reactive to light.   Neck:      Vascular: No JVD.   Cardiovascular:      Rate and Rhythm: Normal rate and regular rhythm.      Heart sounds: No murmur heard.  Pulmonary:      Effort: Pulmonary effort is normal.      Breath sounds: Normal breath sounds. No wheezing or rhonchi.   Chest:   Breasts:     Right: Normal. No swelling, mass, nipple discharge, skin change or tenderness.      Left: Normal. No swelling, mass, nipple discharge, skin change or tenderness.   Abdominal:      General: Bowel sounds are normal. There is no distension.      Palpations: Abdomen is soft. There is no mass.      Tenderness: There is no abdominal tenderness.   Musculoskeletal:         General: No swelling or deformity.      Cervical back: Neck supple.   Lymphadenopathy:      Head:      Right side of head: No submandibular adenopathy.      Left side of head: No submandibular adenopathy.      Cervical: No cervical adenopathy.      Upper Body:      Right upper body: No supraclavicular or axillary adenopathy.      Left upper body: No supraclavicular or axillary adenopathy.      Lower Body: No right inguinal adenopathy. No left inguinal adenopathy.   Skin:     General: Skin is warm.      Coloration: Skin is not jaundiced.      Findings: No lesion or rash.      Nails: There is no clubbing.   Neurological:      General: No focal deficit present.      Mental Status: She is alert and oriented to person, place, and time.      Cranial Nerves: Cranial nerves 2-12 are intact.   Psychiatric:         Attention and Perception: Attention normal.         Behavior: Behavior is cooperative.         Judgment: Judgment normal.     ECOG SCORE    0 - Fully active-able to carry on all pre-disease performance  without restriction         Laboratory:  CBC with Differential:  Lab Results   Component Value Date    WBC 8.5 06/08/2022    RBC 5.24 06/08/2022    HGB 13.6 06/08/2022    HCT 43.1 06/08/2022    MCV 82.3 06/08/2022    MCH 26.0 (L) 06/08/2022    MCHC 31.6 (L) 06/08/2022    RDW 13.8 06/08/2022     06/08/2022    MPV 9.0 (L) 06/08/2022        CMP:  Sodium Level   Date Value Ref Range Status   06/08/2022 143 136 - 145 mmol/L Final     Potassium Level   Date Value Ref Range Status   06/08/2022 4.0 3.5 - 5.1 mmol/L Final     Carbon Dioxide   Date Value Ref Range Status   06/08/2022 33 (H) 23 - 31 mmol/L Final     Blood Urea Nitrogen   Date Value Ref Range Status   06/08/2022 11.6 9.8 - 20.1 mg/dL Final     Creatinine   Date Value Ref Range Status   06/08/2022 0.73 0.55 - 1.02 mg/dL Final     Calcium Level Total   Date Value Ref Range Status   06/08/2022 10.0 8.4 - 10.2 mg/dL Final     Albumin Level   Date Value Ref Range Status   06/08/2022 3.7 3.4 - 4.8 gm/dL Final     Bilirubin Total   Date Value Ref Range Status   06/08/2022 0.4 <=1.5 mg/dL Final     Alkaline Phosphatase   Date Value Ref Range Status   06/08/2022 66 40 - 150 unit/L Final     Aspartate Aminotransferase   Date Value Ref Range Status   06/08/2022 13 5 - 34 unit/L Final     Alanine Aminotransferase   Date Value Ref Range Status   06/08/2022 11 0 - 55 unit/L Final     Estimated GFR-Non    Date Value Ref Range Status   06/08/2022 >60 mls/min/1.73/m2 Final             Assessment:       1. Malignant neoplasm of central portion of right breast in female, estrogen receptor positive    2. Use of aromatase inhibitors      If next bone density with osteopenia will be recommended to start bisphosphonates.        Plan:         Continue Arimidex 1 mg po daily x > 5yrs  Continue calcium + vitamin D & weight bearing exercises.   Bone density is due in 9/2023--order placed  Mammogram ordered by Dr. Christensen--she usually sees Dr Christensen after   RTC  in 6 months with labs  The patient was seen, interviewed and examined. Pertinent lab and radiology studies were reviewed.   The patient was given ample opportunity to ask questions, and to the best of my abilities, all questions answered to satisfaction; patient demonstrated understanding of what we discussed and agreeable to the plan. Pt instructed to call should develop concerning signs/symptoms or have further questions.       Nadine Shaffer MD  Hematology/Oncology

## 2023-08-18 DIAGNOSIS — Z79.811 AROMATASE INHIBITOR USE: ICD-10-CM

## 2023-08-18 RX ORDER — ANASTROZOLE 1 MG/1
TABLET ORAL
Qty: 30 TABLET | Refills: 6 | Status: SHIPPED | OUTPATIENT
Start: 2023-08-18 | End: 2024-01-16

## 2023-09-27 ENCOUNTER — OFFICE VISIT (OUTPATIENT)
Dept: HEMATOLOGY/ONCOLOGY | Facility: CLINIC | Age: 73
End: 2023-09-27
Payer: MEDICARE

## 2023-09-27 VITALS
WEIGHT: 256.31 LBS | BODY MASS INDEX: 37.96 KG/M2 | HEART RATE: 69 BPM | TEMPERATURE: 98 F | RESPIRATION RATE: 20 BRPM | HEIGHT: 69 IN | DIASTOLIC BLOOD PRESSURE: 69 MMHG | SYSTOLIC BLOOD PRESSURE: 130 MMHG | OXYGEN SATURATION: 98 %

## 2023-09-27 DIAGNOSIS — C50.111 MALIGNANT NEOPLASM OF CENTRAL PORTION OF RIGHT BREAST IN FEMALE, ESTROGEN RECEPTOR POSITIVE: Primary | ICD-10-CM

## 2023-09-27 DIAGNOSIS — Z17.0 MALIGNANT NEOPLASM OF CENTRAL PORTION OF RIGHT BREAST IN FEMALE, ESTROGEN RECEPTOR POSITIVE: Primary | ICD-10-CM

## 2023-09-27 DIAGNOSIS — Z79.811 USE OF AROMATASE INHIBITORS: ICD-10-CM

## 2023-09-27 PROCEDURE — 99214 OFFICE O/P EST MOD 30 MIN: CPT | Mod: PBBFAC | Performed by: NURSE PRACTITIONER

## 2023-09-27 PROCEDURE — 99999 PR PBB SHADOW E&M-EST. PATIENT-LVL IV: CPT | Mod: PBBFAC,,, | Performed by: NURSE PRACTITIONER

## 2023-09-27 PROCEDURE — 99214 OFFICE O/P EST MOD 30 MIN: CPT | Mod: S$PBB,,, | Performed by: NURSE PRACTITIONER

## 2023-09-27 PROCEDURE — 99999 PR PBB SHADOW E&M-EST. PATIENT-LVL IV: ICD-10-PCS | Mod: PBBFAC,,, | Performed by: NURSE PRACTITIONER

## 2023-09-27 PROCEDURE — 99214 PR OFFICE/OUTPT VISIT, EST, LEVL IV, 30-39 MIN: ICD-10-PCS | Mod: S$PBB,,, | Performed by: NURSE PRACTITIONER

## 2023-09-27 NOTE — PROGRESS NOTES
HEMATOLOGY/ONCOLOGY OFFICE CLINIC VISIT    Visit Information:    Initial Consultation:9/20/18  Referring Physician: Dr Chao Christensen  Other Physicians:  Code Status: Not addressed     Diagnosis/Problem list:   T1 N0 MX -Stage I Right breast carcinoma, ER/AR positive, HER-2 negative, 0/3 SLN + Dx 7/30/18  Oncotype RR 15     Present Treatment:  Arimidex---started 11/2018     Treatment history:    Right lumpectomy 8/15/18     Plan of care: Endocrine therapy > 5 years    Imaging:  MMG 9/16/2021: BI-RADS 2-benign findings   MMG 9/19/2022: BI-RADS 2-benign findings       Bone density 9/16/2021 Interval minimal bone mineral density decrease however, the study remains within normal limits. Repeat exam is recommended in September 2023.    Pathology:      CLINICAL HISTORY:       Patient: Jose L Mcdonough is a 72 y.o. female.kindly referred by Dr. Christensen for newly diagnosed breast cancer.  Patient was undergoing routine mammograms [facility] on 7/18/18 and new asymmetry in the retroareolar region of the right breast was seen require additional imaging.  Mammogram was read category 0 Spot compression and magnification views of the right breast and possible ultrasound was recommended.  Mammogram was read by Dr. Lisa Barney.     On 7/26/18 she underwent diagnostic mammogram and ultrasound of the right breast that revealed a suspicious spiculated 3 mm mass located in the retroareolar right breast at 12 o'clock position.  Ultrasound-guided biopsy was recommended.  Ultrasound-guided core biopsy of the right breast on 7/30/18 was performed with pathology revealing 0.9 cm invasive carcinoma with ductal and lobular features.  No lymphovascular invasion identified.  Ductal carcinoma in situ, solid cribriform and papillary patterns (intermediate nuclear grade) with calcification and without necrosis.     On 8/15/18 she underwent right breast lumpectomy with right axillary sentinel lymph node biopsy by Dr. Guidry  Fermin.  Pathology report again revealed well-differentiated invasive ductal carcinoma, 1.2 cm in greatest dimension.  Clear margins.  Ductal carcinoma in situ, intermediate nuclear grade, 0.5 cm, solid and cribriform types with abundant calcifications and without necrosis.  Clear margins.  3 sentinel lymph node all negative for malignancy.  Estrogen receptor 94.8%, progesterone receptor 94.7%, HER-2/serafin 2+ by IHC, negative by fish.  Ki 6720.5%.  Genetic testing was performed with negative results.     Oncotype revealed a recurrence score results of 15, meaning 10% risk of distant recurrence at 10 years, low recurrence risk.      Mother was diagnosed with metastatic breast cancer in her 70's. Her sister has breast cancer and diagnosed at 66.  Maternal grandmother colon cancer when she was elderly.     Patient denies any problems.  She denies any fever, chills, sweats.  No chest pain or shortness of breath.  No changes in bowel habits.  No bleeding.  No neurological symptoms.  Patient has healed well from her surgery.     Bone density 11/1/18 performed at [facility] and was normal.        On 2/26/2019 she had a right breast mammogram and ultrasound that demonstrated no mammographic or sonographic evidence for breast malignancy. There are post surgical changes in the right breast and right axilla. There is a 26 mm seroma in the lumpectomy bed in the retroareolar right breast. Patient should return in July for a diagnostic bilateral mammograms to resume her annual mammographic screening interval.     On April 4, 2019, she underwent her first colonoscopy. She had 6 polyps removed. 3 were tubular adenomas. One was a sessile serrated adenoma.  2 were hyperplastic polyps.   She reports that Dr Christensen did not like [place] and had an aspiration and was just fat tissue and no papilloma in 10/2021.     Chief Complaint: No chief complaint on file.    Wt Readings from Last 10 Encounters:    09/27/23 116.3 kg (256 lb 4.8 oz)   03/27/23 115.3 kg (254 lb 4.8 oz)   07/27/22 115.7 kg (255 lb)   05/23/22 113.9 kg (251 lb 3.2 oz)   ]    Interval History:    Patient presents today in f/u of breast cancer diagnosed in 7/2018, currently on Arimidex. She completed radiation on 10/30/18 and started Arimidex in 11/2018. She is tolerating well.  No fever, chills, sweats.  No chest pain or shortness of breath.  No changes in bowel habits.     She had a mammogram is due in 9/20/23, ordered by Dr. Christensen.   FINDINGS:     No new suspicious masses, calcifications or other abnormalities are seen   in either breast. Post therapy changes of the right breast present.     IMPRESSION:   No mammographic evidence of malignancy in either breast.     BI-RADS Category 2:   Benign      She had a boned density test done on 9/26/23: Normal bone density.     Past Medical History:   Diagnosis Date    DM (diabetes mellitus)     Hyperlipidemia     Hypertension     Malignant neoplasm of right female breast     Unspecified chronic bronchitis       Past Surgical History:   Procedure Laterality Date    left breast lumpectomy      MASTECTOMY Right      Family History   Family history unknown: Yes     Social Connections: Not on file       Review of patient's allergies indicates:   Allergen Reactions    Adhesive Hives     Blister, itch, pain    Sulfa (sulfonamide antibiotics)     Cephalosporins Itching     Other reaction(s): itch    Penicillins Itching     Other reaction(s): itch      Current Outpatient Medications on File Prior to Visit   Medication Sig Dispense Refill    albuterol (ACCUNEB) 0.63 mg/3 mL Nebu Take 0.63 mg by nebulization 2 (two) times a day.      amLODIPine (NORVASC) 10 MG tablet Take 10 mg by mouth.      anastrozole (ARIMIDEX) 1 mg Tab TAKE ONE TABLET BY MOUTH EVERY DAY 30 tablet 6    lisinopriL-hydrochlorothiazide (PRINZIDE,ZESTORETIC) 20-12.5 mg per tablet       meloxicam (MOBIC) 15 MG tablet Take 7.5 mg by mouth.       metFORMIN (GLUCOPHAGE) 500 MG tablet Take 500 mg by mouth.      metoprolol succinate (TOPROL-XL) 50 MG 24 hr tablet Take 50 mg by mouth.      montelukast (SINGULAIR) 10 mg tablet Take 10 mg by mouth.      pantoprazole (PROTONIX) 40 MG tablet Take 40 mg by mouth.      pravastatin (PRAVACHOL) 20 MG tablet Take 40 mg by mouth.      QVAR REDIHALER 40 mcg/actuation HFAB       rosuvastatin (CRESTOR) 40 MG Tab Take 40 mg by mouth once daily.       No current facility-administered medications on file prior to visit.      Review of Systems   Musculoskeletal:  Positive for arthralgias.   Neurological:  Positive for weakness.              There were no vitals filed for this visit.     There is no height or weight on file to calculate BMI.  There is no height or weight on file to calculate BSA.  Physical Exam  Vitals and nursing note reviewed.   Constitutional:       General: She is not in acute distress.     Appearance: Normal appearance. She is well-developed.   HENT:      Head: Normocephalic and atraumatic.      Mouth/Throat:      Mouth: Mucous membranes are moist.   Eyes:      General: No scleral icterus.     Extraocular Movements: Extraocular movements intact.      Conjunctiva/sclera: Conjunctivae normal.      Pupils: Pupils are equal, round, and reactive to light.   Neck:      Vascular: No JVD.   Cardiovascular:      Rate and Rhythm: Normal rate and regular rhythm.      Heart sounds: No murmur heard.  Pulmonary:      Effort: Pulmonary effort is normal.      Breath sounds: Normal breath sounds. No wheezing or rhonchi.   Chest:   Breasts:     Right: Normal. No swelling, mass, nipple discharge, skin change or tenderness.      Left: Normal. No swelling, mass, nipple discharge, skin change or tenderness.   Abdominal:      General: Bowel sounds are normal. There is no distension.      Palpations: Abdomen is soft. There is no mass.      Tenderness: There is no abdominal tenderness.   Musculoskeletal:         General: No swelling  or deformity.      Cervical back: Neck supple.   Lymphadenopathy:      Head:      Right side of head: No submandibular adenopathy.      Left side of head: No submandibular adenopathy.      Cervical: No cervical adenopathy.      Upper Body:      Right upper body: No supraclavicular or axillary adenopathy.      Left upper body: No supraclavicular or axillary adenopathy.      Lower Body: No right inguinal adenopathy. No left inguinal adenopathy.   Skin:     General: Skin is warm.      Coloration: Skin is not jaundiced.      Findings: No lesion or rash.      Nails: There is no clubbing.   Neurological:      General: No focal deficit present.      Mental Status: She is alert and oriented to person, place, and time.      Cranial Nerves: Cranial nerves 2-12 are intact.   Psychiatric:         Attention and Perception: Attention normal.         Behavior: Behavior is cooperative.         Judgment: Judgment normal.       ECOG SCORE    0 - Fully active-able to carry on all pre-disease performance without restriction         Laboratory:  CBC with Differential:  Lab Results   Component Value Date    WBC 9.77 09/27/2023    RBC 5.29 09/27/2023    HGB 13.5 09/27/2023    HCT 43.9 09/27/2023    MCV 83.0 09/27/2023    MCH 25.5 (L) 09/27/2023    MCHC 30.8 (L) 09/27/2023    RDW 14.0 09/27/2023     09/27/2023    MPV 9.4 09/27/2023        CMP:  Sodium Level   Date Value Ref Range Status   06/08/2022 143 136 - 145 mmol/L Final     Potassium Level   Date Value Ref Range Status   06/08/2022 4.0 3.5 - 5.1 mmol/L Final     Carbon Dioxide   Date Value Ref Range Status   06/08/2022 33 (H) 23 - 31 mmol/L Final     Blood Urea Nitrogen   Date Value Ref Range Status   06/08/2022 11.6 9.8 - 20.1 mg/dL Final     Creatinine   Date Value Ref Range Status   06/08/2022 0.73 0.55 - 1.02 mg/dL Final     Calcium Level Total   Date Value Ref Range Status   06/08/2022 10.0 8.4 - 10.2 mg/dL Final     Albumin Level   Date Value Ref Range Status    06/08/2022 3.7 3.4 - 4.8 gm/dL Final     Bilirubin Total   Date Value Ref Range Status   06/08/2022 0.4 <=1.5 mg/dL Final     Alkaline Phosphatase   Date Value Ref Range Status   06/08/2022 66 40 - 150 unit/L Final     Aspartate Aminotransferase   Date Value Ref Range Status   06/08/2022 13 5 - 34 unit/L Final     Alanine Aminotransferase   Date Value Ref Range Status   06/08/2022 11 0 - 55 unit/L Final     Estimated GFR-Non    Date Value Ref Range Status   06/08/2022 >60 mls/min/1.73/m2 Final             Assessment:       1. Malignant neoplasm of central portion of right breast in female, estrogen receptor positive    2. Use of aromatase inhibitors             Plan:         Continue Arimidex 1 mg po daily x > 5yrs  Continue calcium + vitamin D & weight bearing exercises.   Bone density done on 9/2023: Normal. Will repeat in 9/2025.  Next mammogram is due in 9/2024: ordered by Dr. Christensen.  RTC in 6 months with labs        TAHIR Darnell

## 2023-09-28 ENCOUNTER — TELEPHONE (OUTPATIENT)
Dept: HEMATOLOGY/ONCOLOGY | Facility: CLINIC | Age: 73
End: 2023-09-28
Payer: MEDICARE

## 2024-01-15 DIAGNOSIS — Z79.811 AROMATASE INHIBITOR USE: ICD-10-CM

## 2024-01-16 RX ORDER — ANASTROZOLE 1 MG/1
TABLET ORAL
Qty: 30 TABLET | Refills: 6 | Status: SHIPPED | OUTPATIENT
Start: 2024-01-16 | End: 2024-05-14 | Stop reason: SDUPTHER

## 2024-05-14 ENCOUNTER — LAB VISIT (OUTPATIENT)
Dept: LAB | Facility: HOSPITAL | Age: 74
End: 2024-05-14
Attending: FAMILY MEDICINE
Payer: MEDICARE

## 2024-05-14 ENCOUNTER — OFFICE VISIT (OUTPATIENT)
Dept: HEMATOLOGY/ONCOLOGY | Facility: CLINIC | Age: 74
End: 2024-05-14
Payer: MEDICARE

## 2024-05-14 VITALS
DIASTOLIC BLOOD PRESSURE: 71 MMHG | OXYGEN SATURATION: 97 % | HEIGHT: 69 IN | SYSTOLIC BLOOD PRESSURE: 121 MMHG | HEART RATE: 67 BPM | TEMPERATURE: 98 F | BODY MASS INDEX: 37.08 KG/M2 | RESPIRATION RATE: 18 BRPM | WEIGHT: 250.38 LBS

## 2024-05-14 DIAGNOSIS — E66.01 MORBID OBESITY: Primary | ICD-10-CM

## 2024-05-14 DIAGNOSIS — I11.9 MALIGNANT HYPERTENSIVE HEART DISEASE WITHOUT HEART FAILURE: ICD-10-CM

## 2024-05-14 DIAGNOSIS — E11.8 DIABETIC COMPLICATION: ICD-10-CM

## 2024-05-14 DIAGNOSIS — Z79.811 USE OF AROMATASE INHIBITORS: ICD-10-CM

## 2024-05-14 DIAGNOSIS — C50.111 MALIGNANT NEOPLASM OF CENTRAL PORTION OF RIGHT BREAST IN FEMALE, ESTROGEN RECEPTOR POSITIVE: ICD-10-CM

## 2024-05-14 DIAGNOSIS — Z79.811 AROMATASE INHIBITOR USE: ICD-10-CM

## 2024-05-14 DIAGNOSIS — E78.41 ELEVATED LIPOPROTEIN A LEVEL: ICD-10-CM

## 2024-05-14 DIAGNOSIS — Z17.0 MALIGNANT NEOPLASM OF CENTRAL PORTION OF RIGHT BREAST IN FEMALE, ESTROGEN RECEPTOR POSITIVE: ICD-10-CM

## 2024-05-14 DIAGNOSIS — E11.9 DIABETES MELLITUS WITHOUT COMPLICATION: Primary | ICD-10-CM

## 2024-05-14 LAB
ALBUMIN SERPL-MCNC: 4.1 G/DL (ref 3.4–4.8)
ALBUMIN/GLOB SERPL: 1.4 RATIO (ref 1.1–2)
ALP SERPL-CCNC: 75 UNIT/L (ref 40–150)
ALT SERPL-CCNC: 12 UNIT/L (ref 0–55)
AST SERPL-CCNC: 15 UNIT/L (ref 5–34)
BASOPHILS # BLD AUTO: 0.06 X10(3)/MCL
BASOPHILS NFR BLD AUTO: 0.7 %
BILIRUB SERPL-MCNC: 0.4 MG/DL
BUN SERPL-MCNC: 16.3 MG/DL (ref 9.8–20.1)
CALCIUM SERPL-MCNC: 9.9 MG/DL (ref 8.4–10.2)
CHLORIDE SERPL-SCNC: 102 MMOL/L (ref 98–107)
CO2 SERPL-SCNC: 31 MMOL/L (ref 23–31)
CREAT SERPL-MCNC: 0.83 MG/DL (ref 0.55–1.02)
EOSINOPHIL # BLD AUTO: 0.09 X10(3)/MCL (ref 0–0.9)
EOSINOPHIL NFR BLD AUTO: 1.1 %
ERYTHROCYTE [DISTWIDTH] IN BLOOD BY AUTOMATED COUNT: 14.3 % (ref 11.5–17)
EST. AVERAGE GLUCOSE BLD GHB EST-MCNC: 148.5 MG/DL
GFR SERPLBLD CREATININE-BSD FMLA CKD-EPI: >60 ML/MIN/1.73/M2
GLOBULIN SER-MCNC: 2.9 GM/DL (ref 2.4–3.5)
GLUCOSE SERPL-MCNC: 106 MG/DL (ref 82–115)
HBA1C MFR BLD: 6.8 %
HCT VFR BLD AUTO: 44.6 % (ref 37–47)
HGB BLD-MCNC: 14 G/DL (ref 12–16)
IMM GRANULOCYTES # BLD AUTO: 0.03 X10(3)/MCL (ref 0–0.04)
IMM GRANULOCYTES NFR BLD AUTO: 0.4 %
LYMPHOCYTES # BLD AUTO: 2.03 X10(3)/MCL (ref 0.6–4.6)
LYMPHOCYTES NFR BLD AUTO: 24.9 %
MCH RBC QN AUTO: 25.9 PG (ref 27–31)
MCHC RBC AUTO-ENTMCNC: 31.4 G/DL (ref 33–36)
MCV RBC AUTO: 82.6 FL (ref 80–94)
MICROALBUMIN UR-MCNC: 38.7 UG/ML
MONOCYTES # BLD AUTO: 0.8 X10(3)/MCL (ref 0.1–1.3)
MONOCYTES NFR BLD AUTO: 9.8 %
NEUTROPHILS # BLD AUTO: 5.14 X10(3)/MCL (ref 2.1–9.2)
NEUTROPHILS NFR BLD AUTO: 63.1 %
NRBC BLD AUTO-RTO: 0 %
PLATELET # BLD AUTO: 314 X10(3)/MCL (ref 130–400)
PMV BLD AUTO: 10.1 FL (ref 7.4–10.4)
POTASSIUM SERPL-SCNC: 4.4 MMOL/L (ref 3.5–5.1)
PROT SERPL-MCNC: 7 GM/DL (ref 5.8–7.6)
RBC # BLD AUTO: 5.4 X10(6)/MCL (ref 4.2–5.4)
SODIUM SERPL-SCNC: 141 MMOL/L (ref 136–145)
TSH SERPL-ACNC: 1.59 UIU/ML (ref 0.35–4.94)
WBC # SPEC AUTO: 8.15 X10(3)/MCL (ref 4.5–11.5)

## 2024-05-14 PROCEDURE — 82043 UR ALBUMIN QUANTITATIVE: CPT

## 2024-05-14 PROCEDURE — 99214 OFFICE O/P EST MOD 30 MIN: CPT | Mod: PBBFAC | Performed by: NURSE PRACTITIONER

## 2024-05-14 PROCEDURE — 36415 COLL VENOUS BLD VENIPUNCTURE: CPT

## 2024-05-14 PROCEDURE — 85025 COMPLETE CBC W/AUTO DIFF WBC: CPT

## 2024-05-14 PROCEDURE — 80053 COMPREHEN METABOLIC PANEL: CPT

## 2024-05-14 PROCEDURE — 84443 ASSAY THYROID STIM HORMONE: CPT

## 2024-05-14 PROCEDURE — 86300 IMMUNOASSAY TUMOR CA 15-3: CPT

## 2024-05-14 PROCEDURE — 99999 PR PBB SHADOW E&M-EST. PATIENT-LVL IV: CPT | Mod: PBBFAC,,, | Performed by: NURSE PRACTITIONER

## 2024-05-14 PROCEDURE — 99214 OFFICE O/P EST MOD 30 MIN: CPT | Mod: S$PBB,,, | Performed by: NURSE PRACTITIONER

## 2024-05-14 PROCEDURE — 83036 HEMOGLOBIN GLYCOSYLATED A1C: CPT

## 2024-05-14 RX ORDER — ANASTROZOLE 1 MG/1
1 TABLET ORAL DAILY
Qty: 30 TABLET | Refills: 6 | Status: SHIPPED | OUTPATIENT
Start: 2024-05-14

## 2024-05-14 NOTE — PROGRESS NOTES
HEMATOLOGY/ONCOLOGY OFFICE CLINIC VISIT    Visit Information:    Initial Consultation:9/20/18  Referring Physician: Dr Chao Christensen  Other Physicians:  Code Status: Not addressed     Diagnosis/Problem list:   T1 N0 MX -Stage I Right breast carcinoma, ER/NH positive, HER-2 negative, 0/3 SLN + Dx 7/30/18  Oncotype RR 15     Present Treatment:  Arimidex---started 11/2018     Treatment history:    Right lumpectomy 8/15/18     Plan of care: Endocrine therapy > 5 years    Imaging:  MMG 9/16/2021: BI-RADS 2-benign findings   MMG 9/19/2022: BI-RADS 2-benign findings       Bone density 9/16/2021 Interval minimal bone mineral density decrease however, the study remains within normal limits. Repeat exam is recommended in September 2023.    Pathology:      CLINICAL HISTORY:       Patient: Jose L Mcdonough is a 73 y.o. female.kindly referred by Dr. Christensen for newly diagnosed breast cancer.  Patient was undergoing routine mammograms at [facility] on 7/18/18 and new asymmetry in the retroareolar region of the right breast was seen require additional imaging.  Mammogram was read category 0 Spot compression and magnification views of the right breast and possible ultrasound was recommended.  Mammogram was read by Dr. Lisa Barney.     On 7/26/18 she underwent diagnostic mammogram and ultrasound of the right breast that revealed a suspicious spiculated 3 mm mass located in the retroareolar right breast at 12 o'clock position.  Ultrasound-guided biopsy was recommended.  Ultrasound-guided core biopsy of the right breast on 7/30/18 was performed with pathology revealing 0.9 cm invasive carcinoma with ductal and lobular features.  No lymphovascular invasion identified.  Ductal carcinoma in situ, solid cribriform and papillary patterns (intermediate nuclear grade) with calcification and without necrosis.     On 8/15/18 she underwent right breast lumpectomy with right axillary sentinel lymph node biopsy by Dr. Guidry  Fermin.  Pathology report again revealed well-differentiated invasive ductal carcinoma, 1.2 cm in greatest dimension.  Clear margins.  Ductal carcinoma in situ, intermediate nuclear grade, 0.5 cm, solid and cribriform types with abundant calcifications and without necrosis.  Clear margins.  3 sentinel lymph node all negative for malignancy.  Estrogen receptor 94.8%, progesterone receptor 94.7%, HER-2/serafin 2+ by IHC, negative by fish.  Ki 6720.5%.  Genetic testing was performed with negative results.     Oncotype revealed a recurrence score results of 15, meaning 10% risk of distant recurrence at 10 years, low recurrence risk.      Mother was diagnosed with metastatic breast cancer in her 70's. Her sister has breast cancer and diagnosed at 66.  Maternal grandmother colon cancer when she was elderly.     Patient denies any problems.  She denies any fever, chills, sweats.  No chest pain or shortness of breath.  No changes in bowel habits.  No bleeding.  No neurological symptoms.  Patient has healed well from her surgery.     Bone density 11/1/18 performed at [facility] and was normal.        On 2/26/2019 she had a right breast mammogram and ultrasound that demonstrated no mammographic or sonographic evidence for breast malignancy. There are post surgical changes in the right breast and right axilla. There is a 26 mm seroma in the lumpectomy bed in the retroareolar right breast. Patient should return in July for a diagnostic bilateral mammograms to resume her annual mammographic screening interval.     On April 4, 2019, she underwent her first colonoscopy. She had 6 polyps removed. 3 were tubular adenomas. One was a sessile serrated adenoma.  2 were hyperplastic polyps.   She reports that Dr Christensen did not like [place] and had an aspiration and was just fat tissue and no papilloma in 10/2021.     Chief Complaint: 6 Month Follow Up    Wt Readings from Last 10 Encounters:   05/14/24 113.6 kg  (250 lb 6.4 oz)   09/27/23 116.3 kg (256 lb 4.8 oz)   03/27/23 115.3 kg (254 lb 4.8 oz)   07/27/22 115.7 kg (255 lb)   05/23/22 113.9 kg (251 lb 3.2 oz)   ]    Interval History:    Patient presents today in f/u of breast cancer diagnosed in 7/2018, currently on Arimidex. She completed radiation on 10/30/18 and started Arimidex in 11/2018. She has chronic joint discomfort and weakness to her lower extremities. This is not new and she is said that he is going to try to start walking more to improve her activity level and tolerance.  No fever, chills, sweats.  No chest pain or shortness of breath.  No changes in bowel habits.     She had a mammogram is due in 9/20/23, ordered by Dr. Christensen.   FINDINGS:     No new suspicious masses, calcifications or other abnormalities are seen   in either breast. Post therapy changes of the right breast present.     IMPRESSION:   No mammographic evidence of malignancy in either breast.     BI-RADS Category 2:   Benign      She had a boned density test done on 9/26/23: Normal bone density.     Past Medical History:   Diagnosis Date    DM (diabetes mellitus)     Hyperlipidemia     Hypertension     Malignant neoplasm of right female breast     Unspecified chronic bronchitis       Past Surgical History:   Procedure Laterality Date    left breast lumpectomy      MASTECTOMY Right      Family History   Family history unknown: Yes            Review of patient's allergies indicates:   Allergen Reactions    Adhesive Hives     Blister, itch, pain    Sulfa (sulfonamide antibiotics)     Cephalosporins Itching     Other reaction(s): itch    Penicillins Itching     Other reaction(s): itch      Current Outpatient Medications on File Prior to Visit   Medication Sig Dispense Refill    albuterol (ACCUNEB) 0.63 mg/3 mL Nebu Take 0.63 mg by nebulization 2 (two) times a day.      amLODIPine (NORVASC) 10 MG tablet Take 10 mg by mouth.      anastrozole (ARIMIDEX) 1 mg Tab TAKE ONE TABLET BY MOUTH EVERY DAY  "30 tablet 6    lisinopriL-hydrochlorothiazide (PRINZIDE,ZESTORETIC) 20-12.5 mg per tablet       meloxicam (MOBIC) 15 MG tablet Take 7.5 mg by mouth.      metFORMIN (GLUCOPHAGE) 500 MG tablet Take 500 mg by mouth.      metoprolol succinate (TOPROL-XL) 50 MG 24 hr tablet Take 50 mg by mouth.      montelukast (SINGULAIR) 10 mg tablet Take 10 mg by mouth.      pantoprazole (PROTONIX) 40 MG tablet Take 40 mg by mouth.      pravastatin (PRAVACHOL) 20 MG tablet Take 40 mg by mouth.      QVAR REDIHALER 40 mcg/actuation HFAB       rosuvastatin (CRESTOR) 40 MG Tab Take 40 mg by mouth once daily.       No current facility-administered medications on file prior to visit.      Review of Systems   Constitutional:  Negative for appetite change.   HENT:  Negative for mouth sores.    Eyes:  Negative for visual disturbance.   Respiratory:  Negative for cough and shortness of breath.    Cardiovascular:  Negative for chest pain.   Gastrointestinal:  Negative for diarrhea.   Musculoskeletal:  Positive for arthralgias.   Integumentary:  Negative for rash.   Neurological:  Positive for weakness. Negative for headaches.   Hematological:  Negative for adenopathy.              Vitals:    05/14/24 1050   BP: 121/71   BP Location: Left arm   Patient Position: Sitting   Pulse: 67   Resp: 18   Temp: 98.1 °F (36.7 °C)   TempSrc: Oral   SpO2: 97%   Weight: 113.6 kg (250 lb 6.4 oz)   Height: 5' 9" (1.753 m)        Body mass index is 36.98 kg/m².  Body surface area is 2.35 meters squared.  Physical Exam  Vitals and nursing note reviewed.   Constitutional:       General: She is not in acute distress.     Appearance: Normal appearance. She is well-developed.   HENT:      Head: Normocephalic and atraumatic.      Mouth/Throat:      Mouth: Mucous membranes are moist.   Eyes:      General: No scleral icterus.     Extraocular Movements: Extraocular movements intact.      Conjunctiva/sclera: Conjunctivae normal.      Pupils: Pupils are equal, round, and " reactive to light.   Neck:      Vascular: No JVD.   Cardiovascular:      Rate and Rhythm: Normal rate and regular rhythm.      Heart sounds: No murmur heard.  Pulmonary:      Effort: Pulmonary effort is normal.      Breath sounds: Normal breath sounds. No wheezing or rhonchi.   Chest:   Breasts:     Right: Normal. No swelling, mass, nipple discharge, skin change or tenderness.      Left: Normal. No swelling, mass, nipple discharge, skin change or tenderness.   Abdominal:      General: Bowel sounds are normal. There is no distension.      Palpations: Abdomen is soft. There is no mass.      Tenderness: There is no abdominal tenderness.   Musculoskeletal:         General: No swelling or deformity.      Cervical back: Neck supple.   Lymphadenopathy:      Head:      Right side of head: No submandibular adenopathy.      Left side of head: No submandibular adenopathy.      Cervical: No cervical adenopathy.      Upper Body:      Right upper body: No supraclavicular or axillary adenopathy.      Left upper body: No supraclavicular or axillary adenopathy.      Lower Body: No right inguinal adenopathy. No left inguinal adenopathy.   Skin:     General: Skin is warm.      Coloration: Skin is not jaundiced.      Findings: No lesion or rash.      Nails: There is no clubbing.   Neurological:      General: No focal deficit present.      Mental Status: She is alert and oriented to person, place, and time.      Cranial Nerves: Cranial nerves 2-12 are intact.   Psychiatric:         Attention and Perception: Attention normal.         Behavior: Behavior is cooperative.         Judgment: Judgment normal.       ECOG SCORE             Laboratory:  CBC with Differential:  Lab Results   Component Value Date    WBC 8.15 05/14/2024    RBC 5.40 05/14/2024    HGB 14.0 05/14/2024    HCT 44.6 05/14/2024    MCV 82.6 05/14/2024    MCH 25.9 (L) 05/14/2024    MCHC 31.4 (L) 05/14/2024    RDW 14.3 05/14/2024     05/14/2024    MPV 10.1 05/14/2024         CMP:  Sodium   Date Value Ref Range Status   05/14/2024 141 136 - 145 mmol/L Final     Potassium   Date Value Ref Range Status   05/14/2024 4.4 3.5 - 5.1 mmol/L Final     CO2   Date Value Ref Range Status   05/14/2024 31 23 - 31 mmol/L Final     Blood Urea Nitrogen   Date Value Ref Range Status   05/14/2024 16.3 9.8 - 20.1 mg/dL Final     Creatinine   Date Value Ref Range Status   05/14/2024 0.83 0.55 - 1.02 mg/dL Final     Calcium   Date Value Ref Range Status   05/14/2024 9.9 8.4 - 10.2 mg/dL Final     Albumin   Date Value Ref Range Status   05/14/2024 4.1 3.4 - 4.8 g/dL Final     Bilirubin Total   Date Value Ref Range Status   05/14/2024 0.4 <=1.5 mg/dL Final     ALP   Date Value Ref Range Status   05/14/2024 75 40 - 150 unit/L Final     AST   Date Value Ref Range Status   05/14/2024 15 5 - 34 unit/L Final     ALT   Date Value Ref Range Status   05/14/2024 12 0 - 55 unit/L Final     Estimated GFR-Non    Date Value Ref Range Status   06/08/2022 >60 mls/min/1.73/m2 Final             Assessment:       1. Morbid obesity    2. Malignant neoplasm of central portion of right breast in female, estrogen receptor positive             Plan:         Continue Arimidex 1 mg po daily x > 5yrs  Continue calcium + vitamin D & weight bearing exercises.   Bone density done on 9/2023: Normal. Will repeat in 9/2025.  Next mammogram is due in 9/2024: ordered by Dr. Christensen.  RTC in 6 months with TAHIR Arita

## 2024-05-15 LAB — CANCER AG27-29 SERPL-ACNC: 14.3 U/ML

## 2024-09-03 ENCOUNTER — TELEPHONE (OUTPATIENT)
Dept: HEMATOLOGY/ONCOLOGY | Facility: CLINIC | Age: 74
End: 2024-09-03
Payer: MEDICARE

## 2024-09-03 DIAGNOSIS — Z17.0 MALIGNANT NEOPLASM OF CENTRAL PORTION OF RIGHT BREAST IN FEMALE, ESTROGEN RECEPTOR POSITIVE: ICD-10-CM

## 2024-09-03 DIAGNOSIS — C50.111 MALIGNANT NEOPLASM OF CENTRAL PORTION OF RIGHT BREAST IN FEMALE, ESTROGEN RECEPTOR POSITIVE: ICD-10-CM

## 2024-09-03 DIAGNOSIS — Z12.31 SCREENING MAMMOGRAM FOR HIGH-RISK PATIENT: Primary | ICD-10-CM

## 2024-09-03 NOTE — TELEPHONE ENCOUNTER
Patient calling regarding scheduling of mammogram. Voice message forwarded to radiology scheduling team.

## 2024-09-23 DIAGNOSIS — Z12.31 SCREENING MAMMOGRAM FOR HIGH-RISK PATIENT: ICD-10-CM

## 2024-09-23 DIAGNOSIS — R92.8 ABNORMAL MAMMOGRAM: ICD-10-CM

## 2024-09-23 DIAGNOSIS — Z17.0 MALIGNANT NEOPLASM OF CENTRAL PORTION OF RIGHT BREAST IN FEMALE, ESTROGEN RECEPTOR POSITIVE: Primary | ICD-10-CM

## 2024-09-23 DIAGNOSIS — C50.111 MALIGNANT NEOPLASM OF CENTRAL PORTION OF RIGHT BREAST IN FEMALE, ESTROGEN RECEPTOR POSITIVE: Primary | ICD-10-CM

## 2024-10-04 ENCOUNTER — TELEPHONE (OUTPATIENT)
Dept: HEMATOLOGY/ONCOLOGY | Facility: CLINIC | Age: 74
End: 2024-10-04
Payer: MEDICARE

## 2024-10-17 ENCOUNTER — TELEPHONE (OUTPATIENT)
Dept: HEMATOLOGY/ONCOLOGY | Facility: CLINIC | Age: 74
End: 2024-10-17
Payer: MEDICARE

## 2025-01-27 NOTE — PROGRESS NOTES
HEMATOLOGY/ONCOLOGY OFFICE CLINIC VISIT    Visit Information:    Initial Consultation:9/20/18  Referring Physician: Dr Chao Christensen  Other Physicians:  Code Status: Not addressed     Diagnosis/Problem list:   T1 N0 MX -Stage I Right breast carcinoma, ER/VA positive, HER-2 negative, 0/3 SLN + Dx 7/30/18  Oncotype RR 15     Present Treatment:  Arimidex---started 11/2018     Treatment history:    Right lumpectomy 8/15/18     Plan of care: Endocrine therapy > 5 years    Imaging:  MMG 9/16/2021: BI-RADS 2-benign findings   MMG 9/19/2022: BI-RADS 2-benign findings   MMG 10/03/2024: There are scattered fibroglandular densities. Within the right breast at the 12:00 retroareolar position, there is a small oval mass with indistinct margins and internal punctate calcification. This is near the patient's lumpectomy scar tissue. No suspicious masses, microcalcifications, or other abnormalities are seen within the left breast. Area concern within the left breast is marked by triangular skin marker.     Bone density 9/16/2021: Interval minimal bone mineral density decrease however, the study remains within normal limits. Repeat exam is recommended in September 2023.  Bone density 9/26/23: Normal bone density.   Pathology:      CLINICAL HISTORY:       Patient: Jose L Mcdonough is a 74 y.o. female.kindly referred by Dr. Christensen for newly diagnosed breast cancer.  Patient was undergoing routine [facility] on 7/18/18 and new asymmetry in the retroareolar region of the right breast was seen require additional imaging.  Mammogram was read category 0 Spot compression and magnification views of the right breast and possible ultrasound was recommended.  Mammogram was read by Dr. Lisa Barney.     On 7/26/18 she underwent diagnostic mammogram and ultrasound of the right breast that revealed a suspicious spiculated 3 mm mass located in the retroareolar right breast at 12 o'clock position.  Ultrasound-guided  biopsy was recommended.  Ultrasound-guided core biopsy of the right breast on 7/30/18 was performed with pathology revealing 0.9 cm invasive carcinoma with ductal and lobular features.  No lymphovascular invasion identified.  Ductal carcinoma in situ, solid cribriform and papillary patterns (intermediate nuclear grade) with calcification and without necrosis.     On 8/15/18 she underwent right breast lumpectomy with right axillary sentinel lymph node biopsy by Dr. Chao Christensen.  Pathology report again revealed well-differentiated invasive ductal carcinoma, 1.2 cm in greatest dimension.  Clear margins.  Ductal carcinoma in situ, intermediate nuclear grade, 0.5 cm, solid and cribriform types with abundant calcifications and without necrosis.  Clear margins.  3 sentinel lymph node all negative for malignancy.  Estrogen receptor 94.8%, progesterone receptor 94.7%, HER-2/serafin 2+ by IHC, negative by fish.  Ki 6720.5%.  Genetic testing was performed with negative results.     Oncotype revealed a recurrence score results of 15, meaning 10% risk of distant recurrence at 10 years, low recurrence risk.      Mother was diagnosed with metastatic breast cancer in her 70's. Her sister has breast cancer and diagnosed at 66.  Maternal grandmother colon cancer when she was elderly.     Patient denies any problems.  She denies any fever, chills, sweats.  No chest pain or shortness of breath.  No changes in bowel habits.  No bleeding.  No neurological symptoms.  Patient has healed well from her surgery.     Bone density 11/1/18 performed at [facility] and was normal.        On 2/26/2019 she had a right breast mammogram and ultrasound that demonstrated no mammographic or sonographic evidence for breast malignancy. There are post surgical changes in the right breast and right axilla. There is a 26 mm seroma in the lumpectomy bed in the retroareolar right breast. Patient should return in July for a diagnostic bilateral  mammograms to resume her annual mammographic screening interval.     On April 4, 2019, she underwent her first colonoscopy. She had 6 polyps removed. 3 were tubular adenomas. One was a sessile serrated adenoma.  2 were hyperplastic polyps.   She reports that Dr Christensen did not like something [place] saw in the [place] and had an aspiration and was just fat tissue and no papilloma in 10/2021.     Chief Complaint: 6 Month Follow Up      Interval History:    02/05/25: Patient presents today for 6 month f/u of breast cancer diagnosed in 7/2018, currently on Arimidex. Tolerating well. She completed radiation on 10/30/18 and started Arimidex in 11/2018. Patient had mammogram on 10/3/24, biopsy 10/22/24 - benign findings. Pt pulse 56 today, followed by cardiologist Dr. Hines. Patient complained of an area on her back that was itching and painful, was seen by Dr Patel, nothing present on exam today. No fever, chills, sweats. No chest pain or shortness of breath.  No changes in bowel habits.     Past Medical History:   Diagnosis Date    DM (diabetes mellitus)     Hyperlipidemia     Hypertension     Malignant neoplasm of right female breast     Unspecified chronic bronchitis       Past Surgical History:   Procedure Laterality Date    left breast lumpectomy      MASTECTOMY Right      Family History   Family history unknown: Yes            Review of patient's allergies indicates:   Allergen Reactions    Adhesive Hives     Blister, itch, pain    Sulfa (sulfonamide antibiotics)     Cephalosporins Itching     Other reaction(s): itch    Penicillins Itching     Other reaction(s): itch      Current Outpatient Medications on File Prior to Visit   Medication Sig Dispense Refill    albuterol (ACCUNEB) 0.63 mg/3 mL Nebu Take 0.63 mg by nebulization 2 (two) times a day.      amLODIPine (NORVASC) 10 MG tablet Take 10 mg by mouth.      anastrozole (ARIMIDEX) 1 mg Tab Take 1 tablet (1 mg total) by mouth once daily. 30 tablet 6     "lisinopriL-hydrochlorothiazide (PRINZIDE,ZESTORETIC) 20-12.5 mg per tablet       meloxicam (MOBIC) 15 MG tablet Take 7.5 mg by mouth.      metFORMIN (GLUCOPHAGE) 500 MG tablet Take 500 mg by mouth.      metoprolol succinate (TOPROL-XL) 50 MG 24 hr tablet Take 50 mg by mouth.      montelukast (SINGULAIR) 10 mg tablet Take 10 mg by mouth.      pantoprazole (PROTONIX) 40 MG tablet Take 40 mg by mouth.      pravastatin (PRAVACHOL) 20 MG tablet Take 40 mg by mouth.      QVAR REDIHALER 40 mcg/actuation HFAB       rosuvastatin (CRESTOR) 40 MG Tab Take 40 mg by mouth once daily.       No current facility-administered medications on file prior to visit.      Review of Systems   Constitutional:  Negative for appetite change and unexpected weight change.   HENT:  Negative for mouth sores.    Eyes:  Negative for visual disturbance.   Respiratory:  Negative for cough and shortness of breath.    Cardiovascular:  Negative for chest pain.   Gastrointestinal:  Negative for abdominal pain and diarrhea.   Genitourinary:  Positive for frequency.   Musculoskeletal:  Positive for arthralgias and back pain.   Integumentary:  Negative for rash.   Neurological:  Positive for weakness. Negative for headaches.   Hematological:  Negative for adenopathy.   Psychiatric/Behavioral:  The patient is not nervous/anxious.               Vitals:    02/05/25 0859   BP: 130/75   BP Location: Left arm   Patient Position: Sitting   Pulse: (!) 56   Resp: 18   Temp: 98.2 °F (36.8 °C)   TempSrc: Oral   SpO2: 97%   Weight: 110.2 kg (242 lb 14.4 oz)   Height: 5' 9" (1.753 m)      Body mass index is 35.87 kg/m².  Body surface area is 2.32 meters squared.  Physical Exam  Vitals and nursing note reviewed.   Constitutional:       General: She is not in acute distress.     Appearance: Normal appearance. She is well-developed.   HENT:      Head: Normocephalic and atraumatic.      Mouth/Throat:      Mouth: Mucous membranes are moist.   Eyes:      General: No scleral " icterus.     Extraocular Movements: Extraocular movements intact.      Conjunctiva/sclera: Conjunctivae normal.      Pupils: Pupils are equal, round, and reactive to light.   Neck:      Vascular: No JVD.   Cardiovascular:      Rate and Rhythm: Normal rate and regular rhythm.      Heart sounds: No murmur heard.  Pulmonary:      Effort: Pulmonary effort is normal.      Breath sounds: Normal breath sounds. No wheezing or rhonchi.   Chest:   Breasts:     Right: Normal. No swelling, mass, nipple discharge, skin change or tenderness.      Left: Normal. No swelling, mass, nipple discharge, skin change or tenderness.   Abdominal:      General: Bowel sounds are normal. There is no distension.      Palpations: Abdomen is soft. There is no mass.      Tenderness: There is no abdominal tenderness.   Musculoskeletal:         General: No swelling or deformity.      Cervical back: Neck supple.   Lymphadenopathy:      Head:      Right side of head: No submandibular adenopathy.      Left side of head: No submandibular adenopathy.      Cervical: No cervical adenopathy.      Upper Body:      Right upper body: No supraclavicular or axillary adenopathy.      Left upper body: No supraclavicular or axillary adenopathy.      Lower Body: No right inguinal adenopathy. No left inguinal adenopathy.   Skin:     General: Skin is warm.      Coloration: Skin is not jaundiced.      Findings: No lesion or rash.      Nails: There is no clubbing.   Neurological:      General: No focal deficit present.      Mental Status: She is alert and oriented to person, place, and time.      Cranial Nerves: Cranial nerves 2-12 are intact.   Psychiatric:         Attention and Perception: Attention normal.         Behavior: Behavior is cooperative.         Judgment: Judgment normal.       ECOG SCORE    0 - Fully active-able to carry on all pre-disease performance without restriction         Laboratory:  CBC with Differential:  Lab Results   Component Value Date     WBC 8.32 02/05/2025    RBC 5.23 02/05/2025    HGB 14.0 02/05/2025    HCT 43.5 02/05/2025    MCV 83.2 02/05/2025    MCH 26.8 (L) 02/05/2025    MCHC 32.2 (L) 02/05/2025    RDW 13.9 02/05/2025     02/05/2025    MPV 9.2 02/05/2025      CMP:  Sodium   Date Value Ref Range Status   02/05/2025 140 136 - 145 mmol/L Final     Potassium   Date Value Ref Range Status   02/05/2025 3.6 3.5 - 5.1 mmol/L Final     Chloride   Date Value Ref Range Status   02/05/2025 101 98 - 107 mmol/L Final     CO2   Date Value Ref Range Status   02/05/2025 29 23 - 31 mmol/L Final     Blood Urea Nitrogen   Date Value Ref Range Status   02/05/2025 16.0 9.8 - 20.1 mg/dL Final     Creatinine   Date Value Ref Range Status   02/05/2025 0.86 0.55 - 1.02 mg/dL Final     Calcium   Date Value Ref Range Status   02/05/2025 9.4 8.4 - 10.2 mg/dL Final     Albumin   Date Value Ref Range Status   02/05/2025 3.9 3.4 - 4.8 g/dL Final     Bilirubin Total   Date Value Ref Range Status   02/05/2025 0.4 <=1.5 mg/dL Final     ALP   Date Value Ref Range Status   02/05/2025 67 40 - 150 unit/L Final     AST   Date Value Ref Range Status   02/05/2025 15 5 - 34 unit/L Final     ALT   Date Value Ref Range Status   02/05/2025 10 0 - 55 unit/L Final     Estimated GFR-Non    Date Value Ref Range Status   06/08/2022 >60 mls/min/1.73/m2 Final             Assessment:       1. Malignant neoplasm of central portion of right breast in female, estrogen receptor positive    2. Aromatase inhibitor use    3. Postmenopausal status    4. Morbid obesity    5. Osteoporosis screening    6. Abnormal mammogram           Plan:         Continue Arimidex 1 mg po daily x > 5yrs  Continue calcium + vitamin D & weight bearing exercises.      Bone Density Due 09/2025 - ordered      Mammogram due 10/2025       RTC in 7 months with MD, Dexa results/same day labs: CBC, CMP, CA 27-29        Diagnostic Mammo - right due 04/2025  Virtual appt 2 months for Mammo results  Visit today  included increased complexity associated with the care of the episodic problem Breast cancer, addressing and managing the longitudinal care of the patient's Breast cancer.       Nadine Shaffer MD  Hematology/Oncology  [facility]      Professional Services   I, Gi Macias LPN, acted solely as a scribe for and in the presence of Dr. Nadine Shaffer, who performed these services.

## 2025-02-05 ENCOUNTER — OFFICE VISIT (OUTPATIENT)
Dept: HEMATOLOGY/ONCOLOGY | Facility: CLINIC | Age: 75
End: 2025-02-05
Payer: MEDICARE

## 2025-02-05 ENCOUNTER — LAB VISIT (OUTPATIENT)
Dept: LAB | Facility: HOSPITAL | Age: 75
End: 2025-02-05
Attending: INTERNAL MEDICINE
Payer: MEDICARE

## 2025-02-05 VITALS
RESPIRATION RATE: 18 BRPM | BODY MASS INDEX: 35.97 KG/M2 | SYSTOLIC BLOOD PRESSURE: 130 MMHG | DIASTOLIC BLOOD PRESSURE: 75 MMHG | HEART RATE: 56 BPM | WEIGHT: 242.88 LBS | HEIGHT: 69 IN | TEMPERATURE: 98 F | OXYGEN SATURATION: 97 %

## 2025-02-05 DIAGNOSIS — E66.01 MORBID OBESITY: ICD-10-CM

## 2025-02-05 DIAGNOSIS — Z13.820 OSTEOPOROSIS SCREENING: ICD-10-CM

## 2025-02-05 DIAGNOSIS — C50.111 MALIGNANT NEOPLASM OF CENTRAL PORTION OF RIGHT BREAST IN FEMALE, ESTROGEN RECEPTOR POSITIVE: ICD-10-CM

## 2025-02-05 DIAGNOSIS — Z79.811 AROMATASE INHIBITOR USE: ICD-10-CM

## 2025-02-05 DIAGNOSIS — Z17.0 MALIGNANT NEOPLASM OF CENTRAL PORTION OF RIGHT BREAST IN FEMALE, ESTROGEN RECEPTOR POSITIVE: ICD-10-CM

## 2025-02-05 DIAGNOSIS — Z78.0 POSTMENOPAUSAL STATUS: ICD-10-CM

## 2025-02-05 DIAGNOSIS — C50.111 MALIGNANT NEOPLASM OF CENTRAL PORTION OF RIGHT BREAST IN FEMALE, ESTROGEN RECEPTOR POSITIVE: Primary | ICD-10-CM

## 2025-02-05 DIAGNOSIS — Z17.0 MALIGNANT NEOPLASM OF CENTRAL PORTION OF RIGHT BREAST IN FEMALE, ESTROGEN RECEPTOR POSITIVE: Primary | ICD-10-CM

## 2025-02-05 DIAGNOSIS — R92.8 ABNORMAL MAMMOGRAM: ICD-10-CM

## 2025-02-05 LAB
ALBUMIN SERPL-MCNC: 3.9 G/DL (ref 3.4–4.8)
ALBUMIN/GLOB SERPL: 1.1 RATIO (ref 1.1–2)
ALP SERPL-CCNC: 67 UNIT/L (ref 40–150)
ALT SERPL-CCNC: 10 UNIT/L (ref 0–55)
ANION GAP SERPL CALC-SCNC: 10 MEQ/L
AST SERPL-CCNC: 15 UNIT/L (ref 5–34)
BASOPHILS # BLD AUTO: 0.05 X10(3)/MCL
BASOPHILS NFR BLD AUTO: 0.6 %
BILIRUB SERPL-MCNC: 0.4 MG/DL
BUN SERPL-MCNC: 16 MG/DL (ref 9.8–20.1)
CALCIUM SERPL-MCNC: 9.4 MG/DL (ref 8.4–10.2)
CHLORIDE SERPL-SCNC: 101 MMOL/L (ref 98–107)
CO2 SERPL-SCNC: 29 MMOL/L (ref 23–31)
CREAT SERPL-MCNC: 0.86 MG/DL (ref 0.55–1.02)
CREAT/UREA NIT SERPL: 19
EOSINOPHIL # BLD AUTO: 0.16 X10(3)/MCL (ref 0–0.9)
EOSINOPHIL NFR BLD AUTO: 1.9 %
ERYTHROCYTE [DISTWIDTH] IN BLOOD BY AUTOMATED COUNT: 13.9 % (ref 11.5–17)
GFR SERPLBLD CREATININE-BSD FMLA CKD-EPI: >60 ML/MIN/1.73/M2
GLOBULIN SER-MCNC: 3.4 GM/DL (ref 2.4–3.5)
GLUCOSE SERPL-MCNC: 113 MG/DL (ref 82–115)
HCT VFR BLD AUTO: 43.5 % (ref 37–47)
HGB BLD-MCNC: 14 G/DL (ref 12–16)
IMM GRANULOCYTES # BLD AUTO: 0.01 X10(3)/MCL (ref 0–0.04)
IMM GRANULOCYTES NFR BLD AUTO: 0.1 %
LYMPHOCYTES # BLD AUTO: 2.31 X10(3)/MCL (ref 0.6–4.6)
LYMPHOCYTES NFR BLD AUTO: 27.8 %
MCH RBC QN AUTO: 26.8 PG (ref 27–31)
MCHC RBC AUTO-ENTMCNC: 32.2 G/DL (ref 33–36)
MCV RBC AUTO: 83.2 FL (ref 80–94)
MONOCYTES # BLD AUTO: 0.82 X10(3)/MCL (ref 0.1–1.3)
MONOCYTES NFR BLD AUTO: 9.9 %
NEUTROPHILS # BLD AUTO: 4.97 X10(3)/MCL (ref 2.1–9.2)
NEUTROPHILS NFR BLD AUTO: 59.7 %
PLATELET # BLD AUTO: 297 X10(3)/MCL (ref 130–400)
PMV BLD AUTO: 9.2 FL (ref 7.4–10.4)
POTASSIUM SERPL-SCNC: 3.6 MMOL/L (ref 3.5–5.1)
PROT SERPL-MCNC: 7.3 GM/DL (ref 5.8–7.6)
RBC # BLD AUTO: 5.23 X10(6)/MCL (ref 4.2–5.4)
SODIUM SERPL-SCNC: 140 MMOL/L (ref 136–145)
WBC # BLD AUTO: 8.32 X10(3)/MCL (ref 4.5–11.5)

## 2025-02-05 PROCEDURE — 36415 COLL VENOUS BLD VENIPUNCTURE: CPT

## 2025-02-05 PROCEDURE — 86300 IMMUNOASSAY TUMOR CA 15-3: CPT

## 2025-02-05 PROCEDURE — 85025 COMPLETE CBC W/AUTO DIFF WBC: CPT

## 2025-02-05 PROCEDURE — 99999 PR PBB SHADOW E&M-EST. PATIENT-LVL V: CPT | Mod: PBBFAC,,, | Performed by: INTERNAL MEDICINE

## 2025-02-05 PROCEDURE — G2211 COMPLEX E/M VISIT ADD ON: HCPCS | Mod: S$PBB,,, | Performed by: INTERNAL MEDICINE

## 2025-02-05 PROCEDURE — 99214 OFFICE O/P EST MOD 30 MIN: CPT | Mod: S$PBB,,, | Performed by: INTERNAL MEDICINE

## 2025-02-05 PROCEDURE — 99215 OFFICE O/P EST HI 40 MIN: CPT | Mod: PBBFAC | Performed by: INTERNAL MEDICINE

## 2025-02-05 PROCEDURE — 80053 COMPREHEN METABOLIC PANEL: CPT

## 2025-02-06 DIAGNOSIS — C50.111 MALIGNANT NEOPLASM OF CENTRAL PORTION OF RIGHT BREAST IN FEMALE, ESTROGEN RECEPTOR POSITIVE: Primary | ICD-10-CM

## 2025-02-06 DIAGNOSIS — Z17.0 MALIGNANT NEOPLASM OF CENTRAL PORTION OF RIGHT BREAST IN FEMALE, ESTROGEN RECEPTOR POSITIVE: Primary | ICD-10-CM

## 2025-02-06 LAB — CANCER AG27-29 SERPL-ACNC: <12 U/ML

## 2025-02-07 DIAGNOSIS — Z79.811 AROMATASE INHIBITOR USE: ICD-10-CM

## 2025-02-07 RX ORDER — ANASTROZOLE 1 MG/1
TABLET ORAL
Qty: 30 TABLET | Refills: 2 | Status: SHIPPED | OUTPATIENT
Start: 2025-02-07

## 2025-05-12 DIAGNOSIS — Z79.811 AROMATASE INHIBITOR USE: ICD-10-CM

## 2025-05-12 RX ORDER — ANASTROZOLE 1 MG/1
1 TABLET ORAL DAILY
Qty: 30 TABLET | Refills: 2 | Status: SHIPPED | OUTPATIENT
Start: 2025-05-12

## 2025-05-27 ENCOUNTER — OFFICE VISIT (OUTPATIENT)
Dept: HEMATOLOGY/ONCOLOGY | Facility: CLINIC | Age: 75
End: 2025-05-27
Payer: MEDICARE

## 2025-05-27 DIAGNOSIS — E66.01 MORBID OBESITY: ICD-10-CM

## 2025-05-27 DIAGNOSIS — Z17.0 MALIGNANT NEOPLASM OF CENTRAL PORTION OF RIGHT BREAST IN FEMALE, ESTROGEN RECEPTOR POSITIVE: ICD-10-CM

## 2025-05-27 DIAGNOSIS — C50.111 MALIGNANT NEOPLASM OF CENTRAL PORTION OF RIGHT BREAST IN FEMALE, ESTROGEN RECEPTOR POSITIVE: ICD-10-CM

## 2025-05-27 PROCEDURE — G2211 COMPLEX E/M VISIT ADD ON: HCPCS | Mod: 95,,, | Performed by: NURSE PRACTITIONER

## 2025-05-27 PROCEDURE — 98006 SYNCH AUDIO-VIDEO EST MOD 30: CPT | Mod: 95,,, | Performed by: NURSE PRACTITIONER

## 2025-05-27 NOTE — PROGRESS NOTES
HEMATOLOGY/ONCOLOGY OFFICE CLINIC VISIT    Visit Information:    Initial Consultation:9/20/18  Referring Physician: Dr Chao Christensen  Other Physicians:  Code Status: Not addressed     Diagnosis/Problem list:   T1 N0 MX -Stage I Right breast carcinoma, ER/CT positive, HER-2 negative, 0/3 SLN + Dx 7/30/18  Oncotype RR 15     Present Treatment:  Arimidex---started 11/2018     Treatment history:    Right lumpectomy 8/15/18     Plan of care: Endocrine therapy > 5 years    Imaging:  MMG 9/16/2021: BI-RADS 2-benign findings   MMG 9/19/2022: BI-RADS 2-benign findings   MMG 10/03/2024: There are scattered fibroglandular densities. Within the right breast at the 12:00 retroareolar position, there is a small oval mass with indistinct margins and internal punctate calcification. This is near the patient's lumpectomy scar tissue. No suspicious masses, microcalcifications, or other abnormalities are seen within the left breast. Area concern within the left breast is marked by triangular skin marker.     Bone density 9/16/2021: Interval minimal bone mineral density decrease however, the study remains within normal limits. Repeat exam is recommended in September 2023.  Bone density 9/26/23: Normal bone density.   Pathology:      CLINICAL HISTORY:       Patient: Jose L Mcdonough is a 74 y.o. female.kindly referred by Dr. Christensen for newly diagnosed breast cancer.  Patient was undergoing routine mammograms [facility] on 7/18/18 and new asymmetry in the retroareolar region of the right breast was seen require additional imaging.  Mammogram was read category 0 Spot compression and magnification views of the right breast and possible ultrasound was recommended.  Mammogram was read by Dr. Lisa Barney.     On 7/26/18 she underwent diagnostic mammogram and ultrasound of the right breast that revealed a suspicious spiculated 3 mm mass located in the retroareolar right breast at 12 o'clock position.  Ultrasound-guided  biopsy was recommended.  Ultrasound-guided core biopsy of the right breast on 7/30/18 was performed with pathology revealing 0.9 cm invasive carcinoma with ductal and lobular features.  No lymphovascular invasion identified.  Ductal carcinoma in situ, solid cribriform and papillary patterns (intermediate nuclear grade) with calcification and without necrosis.     On 8/15/18 she underwent right breast lumpectomy with right axillary sentinel lymph node biopsy by Dr. Chao Christensen.  Pathology report again revealed well-differentiated invasive ductal carcinoma, 1.2 cm in greatest dimension.  Clear margins.  Ductal carcinoma in situ, intermediate nuclear grade, 0.5 cm, solid and cribriform types with abundant calcifications and without necrosis.  Clear margins.  3 sentinel lymph node all negative for malignancy.  Estrogen receptor 94.8%, progesterone receptor 94.7%, HER-2/serafin 2+ by IHC, negative by fish.  Ki 6720.5%.  Genetic testing was performed with negative results.     Oncotype revealed a recurrence score results of 15, meaning 10% risk of distant recurrence at 10 years, low recurrence risk.      Mother was diagnosed with metastatic breast cancer in her 70's. Her sister has breast cancer and diagnosed at 66.  Maternal grandmother colon cancer when she was elderly.     Patient denies any problems.  She denies any fever, chills, sweats.  No chest pain or shortness of breath.  No changes in bowel habits.  No bleeding.  No neurological symptoms.  Patient has healed well from her surgery.     Bone density 11/1/18 performed at [facility] and was normal.        On 2/26/2019 she had a right breast mammogram and ultrasound that demonstrated no mammographic or sonographic evidence for breast malignancy. There are post surgical changes in the right breast and right axilla. There is a 26 mm seroma in the lumpectomy bed in the retroareolar right breast. Patient should return in July for a diagnostic bilateral  mammograms to resume her annual mammographic screening interval.     On April 4, 2019, she underwent her first colonoscopy. She had 6 polyps removed. 3 were tubular adenomas. One was a sessile serrated adenoma.  2 were hyperplastic polyps.   She reports that Dr Christensen did not like something she saw in [place] and had an aspiration and was just fat tissue and no papilloma in 10/2021.     Chief Complaint: 2 Month Follow Up (Virtual Visit- Mammo Results.)      Interval History:    02/05/25: Patient presents today for 6 month f/u of breast cancer diagnosed in 7/2018, currently on Arimidex. Tolerating well. She completed radiation on 10/30/18 and started Arimidex in 11/2018. Patient had mammogram on 10/3/24, biopsy 10/22/24 - benign findings. Pt pulse 56 today, followed by cardiologist Dr. Hines. Patient complained of an area on her back that was itching and painful, was seen by Dr Patel, nothing present on exam today. No fever, chills, sweats. No chest pain or shortness of breath.  No changes in bowel habits.     Interval history  5/27/2025:  On today's visit the patient presents to the office via telemedicine for follow-up and management of her breast cancer.  She continues Arimidex which she has started back on November of 2018.  Most recent mammogram of the breasts was negative.  She said she has a pink spot in the breasts after the mammogram which is resolving.  She otherwise denies any significant symptoms at all and continues to take calcium and vitamin-D.    Past Medical History:   Diagnosis Date    DM (diabetes mellitus)     Hyperlipidemia     Hypertension     Malignant neoplasm of right female breast     Unspecified chronic bronchitis       Past Surgical History:   Procedure Laterality Date    left breast lumpectomy      MASTECTOMY Right      Family History   Family history unknown: Yes            Review of patient's allergies indicates:   Allergen Reactions    Adhesive Hives     Blister, itch, pain    Sulfa  (sulfonamide antibiotics)     Cephalosporins Itching     Other reaction(s): itch    Penicillins Itching     Other reaction(s): itch      Current Outpatient Medications on File Prior to Visit   Medication Sig Dispense Refill    albuterol (ACCUNEB) 0.63 mg/3 mL Nebu Take 0.63 mg by nebulization 2 (two) times a day.      amLODIPine (NORVASC) 10 MG tablet Take 10 mg by mouth.      anastrozole (ARIMIDEX) 1 mg Tab Take 1 tablet (1 mg total) by mouth once daily. 30 tablet 2    lisinopriL-hydrochlorothiazide (PRINZIDE,ZESTORETIC) 20-12.5 mg per tablet       meloxicam (MOBIC) 15 MG tablet Take 7.5 mg by mouth.      metFORMIN (GLUCOPHAGE) 500 MG tablet Take 500 mg by mouth.      metoprolol succinate (TOPROL-XL) 50 MG 24 hr tablet Take 50 mg by mouth.      montelukast (SINGULAIR) 10 mg tablet Take 10 mg by mouth.      pantoprazole (PROTONIX) 40 MG tablet Take 40 mg by mouth.      pravastatin (PRAVACHOL) 20 MG tablet Take 40 mg by mouth.      QVAR REDIHALER 40 mcg/actuation HFAB       rosuvastatin (CRESTOR) 40 MG Tab Take 40 mg by mouth once daily.       No current facility-administered medications on file prior to visit.      Review of Systems   Constitutional:  Negative for appetite change and unexpected weight change.   HENT:  Negative for mouth sores.    Eyes:  Negative for visual disturbance.   Respiratory:  Negative for cough and shortness of breath.    Cardiovascular:  Negative for chest pain.   Gastrointestinal:  Negative for abdominal pain and diarrhea.   Genitourinary:  Positive for frequency.   Musculoskeletal:  Positive for arthralgias and back pain.   Integumentary:  Negative for rash.   Neurological:  Positive for weakness. Negative for headaches.   Hematological:  Negative for adenopathy.   Psychiatric/Behavioral:  The patient is not nervous/anxious.               There were no vitals filed for this visit.     There is no height or weight on file to calculate BMI.  There is no height or weight on file to  calculate BSA.  Physical Exam  Vitals and nursing note reviewed.   Constitutional:       General: She is not in acute distress.     Appearance: Normal appearance. She is well-developed.   HENT:      Head: Normocephalic and atraumatic.      Mouth/Throat:      Mouth: Mucous membranes are moist.   Eyes:      General: No scleral icterus.     Extraocular Movements: Extraocular movements intact.      Conjunctiva/sclera: Conjunctivae normal.      Pupils: Pupils are equal, round, and reactive to light.   Neck:      Vascular: No JVD.   Cardiovascular:      Rate and Rhythm: Normal rate and regular rhythm.      Heart sounds: No murmur heard.  Pulmonary:      Effort: Pulmonary effort is normal.      Breath sounds: Normal breath sounds. No wheezing or rhonchi.   Chest:   Breasts:     Right: Normal. No swelling, mass, nipple discharge, skin change or tenderness.      Left: Normal. No swelling, mass, nipple discharge, skin change or tenderness.   Abdominal:      General: Bowel sounds are normal. There is no distension.      Palpations: Abdomen is soft. There is no mass.      Tenderness: There is no abdominal tenderness.   Musculoskeletal:         General: No swelling or deformity.      Cervical back: Neck supple.   Lymphadenopathy:      Head:      Right side of head: No submandibular adenopathy.      Left side of head: No submandibular adenopathy.      Cervical: No cervical adenopathy.      Upper Body:      Right upper body: No supraclavicular or axillary adenopathy.      Left upper body: No supraclavicular or axillary adenopathy.      Lower Body: No right inguinal adenopathy. No left inguinal adenopathy.   Skin:     General: Skin is warm.      Coloration: Skin is not jaundiced.      Findings: No lesion or rash.      Nails: There is no clubbing.   Neurological:      General: No focal deficit present.      Mental Status: She is alert and oriented to person, place, and time.      Cranial Nerves: Cranial nerves 2-12 are intact.    Psychiatric:         Attention and Perception: Attention normal.         Behavior: Behavior is cooperative.         Judgment: Judgment normal.       ECOG SCORE             Laboratory:  CBC with Differential:  Lab Results   Component Value Date    WBC 8.32 02/05/2025    RBC 5.23 02/05/2025    HGB 14.0 02/05/2025    HCT 43.5 02/05/2025    MCV 83.2 02/05/2025    MCH 26.8 (L) 02/05/2025    MCHC 32.2 (L) 02/05/2025    RDW 13.9 02/05/2025     02/05/2025    MPV 9.2 02/05/2025      CMP:  Sodium   Date Value Ref Range Status   02/05/2025 140 136 - 145 mmol/L Final     Potassium   Date Value Ref Range Status   02/05/2025 3.6 3.5 - 5.1 mmol/L Final     Chloride   Date Value Ref Range Status   02/05/2025 101 98 - 107 mmol/L Final     CO2   Date Value Ref Range Status   02/05/2025 29 23 - 31 mmol/L Final     Glucose   Date Value Ref Range Status   02/05/2025 113 82 - 115 mg/dL Final     Blood Urea Nitrogen   Date Value Ref Range Status   02/05/2025 16.0 9.8 - 20.1 mg/dL Final     Creatinine   Date Value Ref Range Status   02/05/2025 0.86 0.55 - 1.02 mg/dL Final     Calcium   Date Value Ref Range Status   02/05/2025 9.4 8.4 - 10.2 mg/dL Final     Protein Total   Date Value Ref Range Status   02/05/2025 7.3 5.8 - 7.6 gm/dL Final     Albumin   Date Value Ref Range Status   02/05/2025 3.9 3.4 - 4.8 g/dL Final     Bilirubin Total   Date Value Ref Range Status   02/05/2025 0.4 <=1.5 mg/dL Final     ALP   Date Value Ref Range Status   02/05/2025 67 40 - 150 unit/L Final     AST   Date Value Ref Range Status   02/05/2025 15 5 - 34 unit/L Final     ALT   Date Value Ref Range Status   02/05/2025 10 0 - 55 unit/L Final     Estimated GFR-Non    Date Value Ref Range Status   06/08/2022 >60 mls/min/1.73/m2 Final             Assessment:       No diagnosis found.         Plan:       Dx:  T1 N0 MX -Stage I Right breast carcinoma, ER/ND positive, HER-2 negative, 0/3 SLN + Dx 7/30/18  Status:  In remission  Patient  continues Arimidex 1 mg p.o. daily.  She has been taking this medication over 5 years now a more than likely we could consider discontinuation of therapy at this time.  Continue calcium + vitamin D & weight bearing exercises.      Bone Density Due 09/2025 - ordered      Mammogram results review BI-RADS 2.  Repeating 1 year      RTC in 6 months with MD, Dexa results/same day labs: CBC, CMP, CA 27-29        Diagnostic Mammo - right due 04/2025  Virtual appt 2 months for Mammo results        Patient instructions and visit orders.       -Follow-up:  F/U with MD  6-month  -Labs:  CBC, CMP, CA27.29- 6 month  -Imaging:    -Other orders:        Eamon Hutchison, MSN, FNP-BC, APRN, AOCNP   Department of Hematology/Oncology.    Visit today included increased complexity associated with the care of the episodic problem Breast cancer, addressing and managing the longitudinal care of the patient's Breast cancer.       The patient location is: [place]  The chief complaint leading to consultation is:  Breast cancer    Visit type: audiovisual    Face to Face time with patient:  15 minutes  38 minutes minutes of total time spent on the encounter, which includes face to face time and non-face to face time preparing to see the patient (eg, review of tests), Obtaining and/or reviewing separately obtained history, Documenting clinical information in the electronic or other health record, Independently interpreting results (not separately reported) and communicating results to the patient/family/caregiver, or Care coordination (not separately reported).         Each patient to whom he or she provides medical services by telemedicine is:  (1) informed of the relationship between the physician and patient and the respective role of any other health care provider with respect to management of the patient; and (2) notified that he or she may decline to receive medical services by telemedicine and may withdraw from such care at any  time.    Notes:

## 2025-08-11 DIAGNOSIS — Z79.811 AROMATASE INHIBITOR USE: ICD-10-CM

## 2025-08-11 RX ORDER — ANASTROZOLE 1 MG/1
TABLET ORAL
Qty: 30 TABLET | Refills: 3 | Status: SHIPPED | OUTPATIENT
Start: 2025-08-11